# Patient Record
Sex: FEMALE | Race: ASIAN | NOT HISPANIC OR LATINO | URBAN - METROPOLITAN AREA
[De-identification: names, ages, dates, MRNs, and addresses within clinical notes are randomized per-mention and may not be internally consistent; named-entity substitution may affect disease eponyms.]

---

## 2021-03-08 PROBLEM — H40.1132: Noted: 2021-03-08

## 2022-04-06 ENCOUNTER — PREPPED CHART (OUTPATIENT)
Dept: URBAN - METROPOLITAN AREA CLINIC 94 | Facility: CLINIC | Age: 67
End: 2022-04-06

## 2022-08-24 ENCOUNTER — FOLLOW UP (OUTPATIENT)
Dept: URBAN - METROPOLITAN AREA CLINIC 94 | Facility: CLINIC | Age: 67
End: 2022-08-24

## 2022-08-24 DIAGNOSIS — H40.1132: ICD-10-CM

## 2022-08-24 PROCEDURE — 92133 CPTRZD OPH DX IMG PST SGM ON: CPT

## 2022-08-24 PROCEDURE — 92083 EXTENDED VISUAL FIELD XM: CPT

## 2022-08-24 PROCEDURE — 92012 INTRM OPH EXAM EST PATIENT: CPT

## 2022-08-24 ASSESSMENT — TONOMETRY
OD_IOP_MMHG: 14
OS_IOP_MMHG: 15

## 2022-08-24 ASSESSMENT — VISUAL ACUITY
OD_CC: 20/20
OS_CC: 20/20

## 2023-03-08 ENCOUNTER — OFFICE VISIT (OUTPATIENT)
Dept: INTERNAL MEDICINE CLINIC | Facility: CLINIC | Age: 68
End: 2023-03-08

## 2023-03-08 ENCOUNTER — TELEPHONE (OUTPATIENT)
Dept: INTERNAL MEDICINE CLINIC | Facility: CLINIC | Age: 68
End: 2023-03-08

## 2023-03-08 VITALS
TEMPERATURE: 98 F | HEART RATE: 59 BPM | BODY MASS INDEX: 24.32 KG/M2 | DIASTOLIC BLOOD PRESSURE: 74 MMHG | WEIGHT: 146 LBS | OXYGEN SATURATION: 100 % | HEIGHT: 65 IN | SYSTOLIC BLOOD PRESSURE: 160 MMHG

## 2023-03-08 DIAGNOSIS — Z12.31 ENCOUNTER FOR SCREENING MAMMOGRAM FOR MALIGNANT NEOPLASM OF BREAST: ICD-10-CM

## 2023-03-08 DIAGNOSIS — Z23 ENCOUNTER FOR IMMUNIZATION: ICD-10-CM

## 2023-03-08 DIAGNOSIS — Z12.31 ENCOUNTER FOR SCREENING MAMMOGRAM FOR BREAST CANCER: ICD-10-CM

## 2023-03-08 DIAGNOSIS — I34.1 MITRAL VALVE PROLAPSE: ICD-10-CM

## 2023-03-08 DIAGNOSIS — E55.9 VITAMIN D DEFICIENCY: ICD-10-CM

## 2023-03-08 DIAGNOSIS — R73.03 PREDIABETES: ICD-10-CM

## 2023-03-08 DIAGNOSIS — Z00.00 MEDICARE ANNUAL WELLNESS VISIT, INITIAL: Primary | ICD-10-CM

## 2023-03-08 DIAGNOSIS — M81.0 AGE RELATED OSTEOPOROSIS, UNSPECIFIED PATHOLOGICAL FRACTURE PRESENCE: ICD-10-CM

## 2023-03-08 DIAGNOSIS — M85.859 OSTEOPENIA OF NECK OF FEMUR, UNSPECIFIED LATERALITY: ICD-10-CM

## 2023-03-08 DIAGNOSIS — Z11.59 NEED FOR HEPATITIS C SCREENING TEST: ICD-10-CM

## 2023-03-08 DIAGNOSIS — E03.8 OTHER SPECIFIED HYPOTHYROIDISM: ICD-10-CM

## 2023-03-08 DIAGNOSIS — L94.0 REYNOLDS SYNDROME (HCC): ICD-10-CM

## 2023-03-08 DIAGNOSIS — E78.2 MIXED HYPERLIPIDEMIA: ICD-10-CM

## 2023-03-08 DIAGNOSIS — H40.9 GLAUCOMA OF BOTH EYES, UNSPECIFIED GLAUCOMA TYPE: ICD-10-CM

## 2023-03-08 DIAGNOSIS — R21 RASH: ICD-10-CM

## 2023-03-08 DIAGNOSIS — K74.3 REYNOLDS SYNDROME (HCC): ICD-10-CM

## 2023-03-08 DIAGNOSIS — I10 PRIMARY HYPERTENSION: ICD-10-CM

## 2023-03-08 RX ORDER — VITAMIN B COMPLEX
1000 TABLET ORAL 2 TIMES DAILY
COMMUNITY

## 2023-03-08 RX ORDER — ROSUVASTATIN CALCIUM 10 MG/1
10 TABLET, COATED ORAL DAILY
Qty: 90 TABLET | Refills: 1 | Status: SHIPPED | OUTPATIENT
Start: 2023-03-08

## 2023-03-08 RX ORDER — LATANOPROST 50 UG/ML
1 SOLUTION/ DROPS OPHTHALMIC DAILY
COMMUNITY
Start: 2023-01-31

## 2023-03-08 RX ORDER — GLUCOSA SU 2KCL/CHONDROITIN SU 500-400 MG
CAPSULE ORAL
COMMUNITY

## 2023-03-08 RX ORDER — LEVOTHYROXINE SODIUM 88 UG/1
88 TABLET ORAL DAILY
COMMUNITY
End: 2023-03-08

## 2023-03-08 RX ORDER — LEVOTHYROXINE SODIUM 88 UG/1
88 TABLET ORAL DAILY
Qty: 90 TABLET | Refills: 1 | Status: SHIPPED | OUTPATIENT
Start: 2023-03-08

## 2023-03-08 RX ORDER — LOSARTAN POTASSIUM 25 MG/1
25 TABLET ORAL DAILY
COMMUNITY
Start: 2022-11-15 | End: 2023-11-15

## 2023-03-08 NOTE — ASSESSMENT & PLAN NOTE
Vaccinations: 2 covid boosters done including bivalent booster, flu shot up to date  Has had one pneumonia vaccine, unclear if it's 15 or 23  Had one dose of shingles vaccine   She'll check her vaccine record  Advance care planning:  Cognition:no concern  Urinary incontinence:no concern  Mammogram:ordered  Colon cancer screen:had it done in 2017, was told to repeat in 10y  DEXA: ordered  Healthy diet and regular exercise

## 2023-03-08 NOTE — PATIENT INSTRUCTIONS
Please check the time of your previous pneumonia vaccine  Please check the record of your previous shingles vaccine  New shingles vaccine (shingrix) is recommended  Tdap or Td is recommended every 10y  Medicare Preventive Visit Patient Instructions  Thank you for completing your Welcome to Medicare Visit or Medicare Annual Wellness Visit today  Your next wellness visit will be due in one year (3/8/2024)  The screening/preventive services that you may require over the next 5-10 years are detailed below  Some tests may not apply to you based off risk factors and/or age  Screening tests ordered at today's visit but not completed yet may show as past due  Also, please note that scanned in results may not display below  Preventive Screenings:  Service Recommendations Previous Testing/Comments   Colorectal Cancer Screening  * Colonoscopy    * Fecal Occult Blood Test (FOBT)/Fecal Immunochemical Test (FIT)  * Fecal DNA/Cologuard Test  * Flexible Sigmoidoscopy Age: 39-70 years old   Colonoscopy: every 10 years (may be performed more frequently if at higher risk)  OR  FOBT/FIT: every 1 year  OR  Cologuard: every 3 years  OR  Sigmoidoscopy: every 5 years  Screening may be recommended earlier than age 39 if at higher risk for colorectal cancer  Also, an individualized decision between you and your healthcare provider will decide whether screening between the ages of 74-80 would be appropriate  Colonoscopy: Not on file  FOBT/FIT: Not on file  Cologuard: Not on file  Sigmoidoscopy: Not on file          Breast Cancer Screening Age: 36 years old  Frequency: every 1-2 years  Not required if history of left and right mastectomy Mammogram: Not on file        Cervical Cancer Screening Between the ages of 21-29, pap smear recommended once every 3 years  Between the ages of 33-67, can perform pap smear with HPV co-testing every 5 years     Recommendations may differ for women with a history of total hysterectomy, cervical cancer, or abnormal pap smears in past  Pap Smear: Not on file        Hepatitis C Screening Once for adults born between 1945 and 1965  More frequently in patients at high risk for Hepatitis C Hep C Antibody: Not on file        Diabetes Screening 1-2 times per year if you're at risk for diabetes or have pre-diabetes Fasting glucose: No results in last 5 years (No results in last 5 years)  A1C: No results in last 5 years (No results in last 5 years)      Cholesterol Screening Once every 5 years if you don't have a lipid disorder  May order more often based on risk factors  Lipid panel: Not on file          Other Preventive Screenings Covered by Medicare:  Abdominal Aortic Aneurysm (AAA) Screening: covered once if your at risk  You're considered to be at risk if you have a family history of AAA  Lung Cancer Screening: covers low dose CT scan once per year if you meet all of the following conditions: (1) Age 50-69; (2) No signs or symptoms of lung cancer; (3) Current smoker or have quit smoking within the last 15 years; (4) You have a tobacco smoking history of at least 20 pack years (packs per day multiplied by number of years you smoked); (5) You get a written order from a healthcare provider  Glaucoma Screening: covered annually if you're considered high risk: (1) You have diabetes OR (2) Family history of glaucoma OR (3)  aged 48 and older OR (3)  American aged 72 and older  Osteoporosis Screening: covered every 2 years if you meet one of the following conditions: (1) You're estrogen deficient and at risk for osteoporosis based off medical history and other findings; (2) Have a vertebral abnormality; (3) On glucocorticoid therapy for more than 3 months; (4) Have primary hyperparathyroidism; (5) On osteoporosis medications and need to assess response to drug therapy  Last bone density test (DXA Scan): Not on file  HIV Screening: covered annually if you're between the age of 12-76   Also covered annually if you are younger than 13 and older than 72 with risk factors for HIV infection  For pregnant patients, it is covered up to 3 times per pregnancy  Immunizations:  Immunization Recommendations   Influenza Vaccine Annual influenza vaccination during flu season is recommended for all persons aged >= 6 months who do not have contraindications   Pneumococcal Vaccine   * Pneumococcal conjugate vaccine = PCV13 (Prevnar 13), PCV15 (Vaxneuvance), PCV20 (Prevnar 20)  * Pneumococcal polysaccharide vaccine = PPSV23 (Pneumovax) Adults 25-60 years old: 1-3 doses may be recommended based on certain risk factors  Adults 72 years old: 1-2 doses may be recommended based off what pneumonia vaccine you previously received   Hepatitis B Vaccine 3 dose series if at intermediate or high risk (ex: diabetes, end stage renal disease, liver disease)   Tetanus (Td) Vaccine - COST NOT COVERED BY MEDICARE PART B Following completion of primary series, a booster dose should be given every 10 years to maintain immunity against tetanus  Td may also be given as tetanus wound prophylaxis  Tdap Vaccine - COST NOT COVERED BY MEDICARE PART B Recommended at least once for all adults  For pregnant patients, recommended with each pregnancy  Shingles Vaccine (Shingrix) - COST NOT COVERED BY MEDICARE PART B  2 shot series recommended in those aged 48 and above     Health Maintenance Due:      Topic Date Due    Hepatitis C Screening  Never done    Breast Cancer Screening: Mammogram  Never done    Colorectal Cancer Screening  Never done     Immunizations Due:      Topic Date Due    COVID-19 Vaccine (1) Never done    Pneumococcal Vaccine: 65+ Years (1 - PCV) Never done    Influenza Vaccine (1) Never done     Advance Directives   What are advance directives? Advance directives are legal documents that state your wishes and plans for medical care   These plans are made ahead of time in case you lose your ability to make decisions for yourself  Advance directives can apply to any medical decision, such as the treatments you want, and if you want to donate organs  What are the types of advance directives? There are many types of advance directives, and each state has rules about how to use them  You may choose a combination of any of the following:  Living will: This is a written record of the treatment you want  You can also choose which treatments you do not want, which to limit, and which to stop at a certain time  This includes surgery, medicine, IV fluid, and tube feedings  Durable power of  for healthcare [facility]): This is a written record that states who you want to make healthcare choices for you when you are unable to make them for yourself  This person, called a proxy, is usually a family member or a friend  You may choose more than 1 proxy  Do not resuscitate (DNR) order:  A DNR order is used in case your heart stops beating or you stop breathing  It is a request not to have certain forms of treatment, such as CPR  A DNR order may be included in other types of advance directives  Medical directive: This covers the care that you want if you are in a coma, near death, or unable to make decisions for yourself  You can list the treatments you want for each condition  Treatment may include pain medicine, surgery, blood transfusions, dialysis, IV or tube feedings, and a ventilator (breathing machine)  Values history: This document has questions about your views, beliefs, and how you feel and think about life  This information can help others choose the care that you would choose  Why are advance directives important? An advance directive helps you control your care  Although spoken wishes may be used, it is better to have your wishes written down  Spoken wishes can be misunderstood, or not followed  Treatments may be given even if you do not want them   An advance directive may make it easier for your family to make difficult choices about your care  © Copyright [employer] 2018 Information is for End User's use only and may not be sold, redistributed or otherwise used for commercial purposes   All illustrations and images included in CareNotes® are the copyrighted property of A D A M , Inc  or [address] [place], [place] [address]chad lola

## 2023-03-08 NOTE — TELEPHONE ENCOUNTER
The pharmacy wants to confirm the patient should be getting the rosuvastatin sprinkle capsules and not the tablets  The patient has gotten the tablets in the past  The tablets cost 3 dollars and the capsules are over a hundred dollars  Please advise   Thank you     [facility] [phone]

## 2023-03-08 NOTE — ASSESSMENT & PLAN NOTE
Pt has documented white coat htn  BP at home is well controlled  On losartan 25mg (could not tolerate 50mg)

## 2023-03-08 NOTE — ASSESSMENT & PLAN NOTE
Pt reports she had autoimmune workups done with previous PCP  Symptoms mild  Responds well to warming  Continue to monitor

## 2023-03-08 NOTE — PROGRESS NOTES
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[facility])     Pt reports she had autoimmune workups done with previous PCP  Symptoms mild  Responds well to warming  Continue to monitor  Endocrine    Other specified hypothyroidism     On levothyroxine  Recheck TSH         Relevant Medications    levothyroxine (Levoxyl) 88 mcg tablet    Other Relevant Orders    TSH, 3rd generation with Free T4 reflex       Cardiovascular and Mediastinum    Primary hypertension     Pt has documented white coat htn  BP at home is well controlled  On losartan 25mg (could not tolerate 50mg)           Relevant Medications    losartan (COZAAR) 25 mg tablet    Other Relevant Orders    Ambulatory Referral to Cardiology    CBC and differential    Comprehensive metabolic panel    Mitral valve prolapse     Borderline mitral valve prolapse on previous Echo  Pt would like to establish care with new cardiologist here  Referral entered         Relevant Orders    Ambulatory Referral to Cardiology       Musculoskeletal and Integument    Osteopenia of neck of femur     Reviewed previous DEXA  Due for DEXA  Vit D and exercise         Rash    Relevant Orders    Ambulatory Referral to Dermatology       Other    Medicare annual wellness visit, subsequent - Primary     Vaccinations: 2 covid boosters done including bivalent booster, flu shot up to date  Has had one pneumonia vaccine, unclear if it's 15 or 23  Had one dose of shingles vaccine  She'll check her vaccine record  Advance care planning:  Cognition:no concern  Urinary incontinence:no concern  Mammogram:ordered  Colon cancer screen:had it done in 2017, was told to repeat in 10y  DEXA: ordered  Healthy diet and regular exercise           Mixed hyperlipidemia     On rosuvastatin     Monitor  Recheck lipid panel         Relevant Medications    Rosuvastatin Calcium 10 MG CPSP    Other Relevant Orders    Lipid panel    Prediabetes Lifestyle modification  F/u a1c         Relevant Orders    Hemoglobin A1C    Vitamin D deficiency    Relevant Orders    Vitamin D 25 hydroxy    Glaucoma of both eyes    Relevant Medications    latanoprost (XALATAN) 0 005 % ophthalmic solution    Other Relevant Orders    Ambulatory Referral to Ophthalmology   Other Visit Diagnoses     Need for hepatitis C screening test        Encounter for screening mammogram for breast cancer        Encounter for immunization        Age related osteoporosis, unspecified pathological fracture presence        Relevant Orders    DXA bone density spine hip and pelvis    Encounter for screening mammogram for malignant neoplasm of breast        Relevant Orders    Mammo screening bilateral w 3d & cad           Preventive health issues were discussed with patient, and age appropriate screening tests were ordered as noted in patient's After Visit Summary  Personalized health advice and appropriate referrals for health education or preventive services given if needed, as noted in patient's After Visit Summary  History of Present Illness:     Patient presents for a Medicare Wellness Visit    HPI   Establish care  Moved from [place]  Lives with   Retired   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, anesthesiologist and spine surgeon     Doing well in general     Patient Care Team:  Hedda Closs, MD as PCP - General (Internal Medicine)     Review of Systems:     Review of Systems     Problem List:     Patient Active Problem List   Diagnosis   • Primary hypertension   • Other specified hypothyroidism   • Medicare annual wellness visit, subsequent   • Mitral valve prolapse   • Mixed hyperlipidemia   • Prediabetes   • 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[facility])   • Osteopenia of neck of femur   • Rash   • Vitamin D deficiency   • Glaucoma of both eyes      Past Medical and Surgical History:     Past Medical History:   Diagnosis Date   • Glaucoma    • Hypertension    • Hypothyroid    • Mitral valve prolapse    • Osteopenia    • Vitamin D deficiency      History reviewed  No pertinent surgical history  Family History:     Family History   Problem Relation Age of Onset   • Diabetes Mother    • Stomach cancer Father    • Prostate cancer Brother       Social History:     Social History     Socioeconomic History   • Marital status: /Civil Union     Spouse name: None   • Number of children: None   • Years of education: None   • Highest education level: None   Occupational History   • None   Tobacco Use   • Smoking status: Never   • Smokeless tobacco: Never   Vaping Use   • Vaping Use: Never used   Substance and Sexual Activity   • Alcohol use: Yes     Comment: occ   • Drug use: Never   • Sexual activity: Not Currently   Other Topics Concern   • None   Social History Narrative   • None     Social Determinants of Health     Financial Resource Strain: Not on file   Food Insecurity: Not on file   Transportation Needs: Not on file   Physical Activity: Not on file   Stress: Not on file   Social Connections: Not on file   Intimate Partner Violence: Not on file   Housing Stability: Not on file      Medications and Allergies:     Current Outpatient Medications   Medication Sig Dispense Refill   • Calcium-Magnesium-Vitamin D (CITRACAL CALCIUM+D PO) Take 650 mg by mouth in the morning     • cholecalciferol (VITAMIN D3) 25 mcg (1,000 units) tablet Take 1,000 mcg by mouth 2 (two) times a day     • Coenzyme Q10 (Co Q10) 100 MG CAPS Take by mouth     • latanoprost (XALATAN) 0 005 % ophthalmic solution Administer 1 drop to both eyes in the morning     • levothyroxine (Levoxyl) 88 mcg tablet Take 1 tablet (88 mcg total) by mouth daily 90 tablet 1   • losartan (COZAAR) 25 mg tablet Take 25 mg by mouth daily     • Rosuvastatin Calcium 10 MG CPSP Take 10 mg by mouth in the morning 90 capsule 2     No current facility-administered medications for this visit  No Known Allergies   Immunizations:        There is no immunization history on file for this patient  Health Maintenance:         Topic Date Due   • Hepatitis C Screening  Never done   • Breast Cancer Screening: Mammogram  Never done   • Colorectal Cancer Screening  Never done         Topic Date Due   • COVID-19 Vaccine (1) Never done   • Pneumococcal Vaccine: 65+ Years (1 - PCV) Never done   • Influenza Vaccine (1) Never done      Medicare Screening Tests and Risk Assessments:     Jeff Wynn is here for her Initial Wellness visit  Health Risk Assessment:   Patient rates overall health as very good  Patient feels that their physical health rating is same  Patient is satisfied with their life  Eyesight was rated as same  Hearing was rated as same  Patient feels that their emotional and mental health rating is slightly better  Patients states they are sometimes angry  Patient states they are sometimes unusually tired/fatigued  Pain experienced in the last 7 days has been some  Patient's pain rating has been 1/10  Patient states that she has experienced no weight loss or gain in last 6 months  Fall Risk Screening: In the past year, patient has experienced: no history of falling in past year      Urinary Incontinence Screening:   Patient has not leaked urine accidently in the last six months  Home Safety:  Patient does not have trouble with stairs inside or outside of their home  Patient has working smoke alarms and has working carbon monoxide detector  Home safety hazards include: none  Nutrition:   Current diet is Low Saturated Fat, No Added Salt and Limited junk food  Medications:   Patient is currently taking over-the-counter supplements  OTC medications include: see medication list  Patient is able to manage medications  Activities of Daily Living (ADLs)/Instrumental Activities of Daily Living (IADLs):   Walk and transfer into and out of bed and chair?: Yes  Dress and groom yourself?: Yes    Bathe or shower yourself?: Yes    Feed yourself?  Yes  Do your laundry/housekeeping?: Yes  Manage your money, pay your bills and track your expenses?: Yes  Make your own meals?: Yes    Do your own shopping?: Yes    Previous Hospitalizations:   Any hospitalizations or ED visits within the last 12 months?: No      Advance Care Planning:   Living will: No      PREVENTIVE SCREENINGS        Cervical Cancer Screening:    General: Screening Not Indicated      Lung Cancer Screening:     General: Screening Not Indicated    Screening, Brief Intervention, and Referral to Treatment (SBIRT)    Screening    Typical number of drinks in a week: 1    Single Item Drug Screening:  How often have you used an illegal drug (including marijuana) or a prescription medication for non-medical reasons in the past year? never    Single Item Drug Screen Score: 0  Interpretation: Negative screen for possible drug use disorder    No results found       Physical Exam:     /74 (BP Location: Left arm, Patient Position: Sitting, Cuff Size: Adult) Comment: patient has white coat syndrome- per pt  Pulse 59   Temp 98 °F (36 7 °C) (Probe)   Ht 5' 4 5" (1 638 m)   Wt 66 2 kg (146 lb)   SpO2 100%   BMI 24 67 kg/m²     Physical Exam     Salma Ann MD

## 2023-03-08 NOTE — ASSESSMENT & PLAN NOTE
Borderline mitral valve prolapse on previous Echo  Pt would like to establish care with new cardiologist here   Referral entered

## 2023-04-05 ENCOUNTER — COMPLETE EYE EXAM (OUTPATIENT)
Dept: URBAN - METROPOLITAN AREA CLINIC 94 | Facility: CLINIC | Age: 68
End: 2023-04-05

## 2023-04-05 DIAGNOSIS — H25.13: ICD-10-CM

## 2023-04-05 DIAGNOSIS — H52.13: ICD-10-CM

## 2023-04-05 DIAGNOSIS — H40.1132: ICD-10-CM

## 2023-04-05 DIAGNOSIS — H02.826: ICD-10-CM

## 2023-04-05 DIAGNOSIS — H02.823: ICD-10-CM

## 2023-04-05 PROCEDURE — 92020 GONIOSCOPY: CPT

## 2023-04-05 PROCEDURE — 92015 DETERMINE REFRACTIVE STATE: CPT

## 2023-04-05 PROCEDURE — 92014 COMPRE OPH EXAM EST PT 1/>: CPT

## 2023-04-05 ASSESSMENT — VISUAL ACUITY
OU_CC: J1+
OS_CC: 20/20
OD_CC: 20/20

## 2023-04-05 ASSESSMENT — TONOMETRY
OD_IOP_MMHG: 17
OS_IOP_MMHG: 16

## 2023-05-07 PROBLEM — Z00.00 MEDICARE ANNUAL WELLNESS VISIT, SUBSEQUENT: Status: RESOLVED | Noted: 2023-03-08 | Resolved: 2023-05-07

## 2023-05-22 ENCOUNTER — OFFICE VISIT (OUTPATIENT)
Dept: INTERNAL MEDICINE CLINIC | Facility: CLINIC | Age: 68
End: 2023-05-22

## 2023-05-22 VITALS
SYSTOLIC BLOOD PRESSURE: 140 MMHG | HEIGHT: 64 IN | BODY MASS INDEX: 25.33 KG/M2 | OXYGEN SATURATION: 96 % | HEART RATE: 67 BPM | WEIGHT: 148.4 LBS | DIASTOLIC BLOOD PRESSURE: 82 MMHG

## 2023-05-22 DIAGNOSIS — M25.511 ACUTE PAIN OF RIGHT SHOULDER: Primary | ICD-10-CM

## 2023-05-22 RX ORDER — ASPIRIN 81 MG/1
81 TABLET, CHEWABLE ORAL DAILY
COMMUNITY

## 2023-05-22 NOTE — ASSESSMENT & PLAN NOTE
Patient's examination consistent with right biceps tendinitis as well as right rotator cuff tendinitis   -Patient has been given a referral for physical therapy  -She may take ibuprofen as needed for pain and inflammation  -She may also apply heat/ice to the affected area as needed

## 2023-05-25 ENCOUNTER — EVALUATION (OUTPATIENT)
Dept: PHYSICAL THERAPY | Age: 68
End: 2023-05-25

## 2023-05-25 DIAGNOSIS — M75.81 TENDINITIS OF RIGHT ROTATOR CUFF: Primary | ICD-10-CM

## 2023-05-25 DIAGNOSIS — M25.511 ACUTE PAIN OF RIGHT SHOULDER: ICD-10-CM

## 2023-05-25 DIAGNOSIS — M75.91 RIGHT SUPRASPINATUS TENDINITIS: ICD-10-CM

## 2023-05-25 NOTE — PROGRESS NOTES
PT Evaluation     Today's date: 2023  Patient name: Homero Wood  : 1955  MRN: [id number]  Referring provider: Mali Dick  Dx:   Encounter Diagnosis     ICD-10-CM    1  Tendinitis of right rotator cuff  M75 81       2  Acute pain of right shoulder  M25 511 Ambulatory Referral to Physical Therapy      3  Right supraspinatus tendinitis  M75 91                      Assessment  Assessment details: Homero Wood is a 75 y o  female who presents with complaints of right shoulder pain without radicular symptoms  No further referral is necessary at this time  Patient has a movement impairment diagnosis of decreased postural control with capsular tightness of right shoulder representing a pathoanatomical diagnosis of rotator cuff tendonitis  Patient is experiencing decreased strength in R shoulder with decreased postural control and pain with functional activities that limits her ability to perform at PLOF  Patient has a positive prognosis  Patient would benefit from PT to address these impairments leading to increased functional capacity and improved quality of life  Patient made aware of condition as well as the proposed treatment plan, including risks, benefits and alternatives  Impairments: impaired physical strength, lacks appropriate home exercise program, pain with function and poor posture     Symptom irritability: lowUnderstanding of Dx/Px/POC: good   Prognosis: good    Goals  Short Term Goals: to be achieved by 4 weeks  1) Patient to be independent with basic HEP  2) Decrease pain to 4/10 at its worst   3) Increase shoulder strength by 1/2 MMT grade in all deficient planes  Long Term Goals: to be achieved by discharge  1) FOTO equal to or greater than 72  2) Patient to be independent with comprehensive HEP  3) Patient will demonstrate maximal over head reaching without pain  4) Increase UE strength to 5/5 MMT grade in all planes to improve a/iadls    5) Patient to report no sleep interruption secondary to pain  6) Patient to be able participate in pickle ball without complaints of pain  Plan  Plan details: Address physical impairments found during IE via therapeutic exercises, neuromuscular re-education, and manual therapy to improve functional tolerance  Develop HEP for self-management of symptoms  Patient would benefit from: skilled physical therapy  Referral necessary: No  Planned therapy interventions: home exercise program, joint mobilization, manual therapy, massage, neuromuscular re-education, patient education, postural training, strengthening, stretching, therapeutic activities, therapeutic exercise and therapeutic training  Frequency: 2x week  Duration in visits: 8  Duration in weeks: 16  Plan of Care beginning date: 2023  Plan of Care expiration date: 2023  Treatment plan discussed with: patient        Subjective Evaluation    History of Present Illness  Mechanism of injury: Patient reports pain in shoulder, on/off thing  When she applies pressure it's painful  She reports thinking it's due to overuse, about a month ago she was making food and stirring and thinks that aggravated her symptoms more  Says as long she rests it feels better  Also started playing pickle ball but aggravates shoulder and has stopped  Advil has helped with symptoms but didn't want to continue to take  Ice wasn't too helpful but heat seemed to help more  Not limited but has pain with functional activities  Sometimes pain at night when sleeping, waking up but able to fall back asleep             Not a recurrent problem   Quality of life: good    Pain  Current pain ratin  At best pain ratin  At worst pain ratin  Location: R shoulder, anterior  Quality: dull ache and sharp  Relieving factors: heat, change in position and rest  Aggravating factors: lifting  Progression: improved      Diagnostic Tests  No diagnostic tests performed  Treatments  No previous or current treatments  Patient Goals  Patient goals for therapy: decreased pain, increased strength and return to sport/leisure activities  Patient goal: use shoulder without pain, return to pickle ball, strength training        Objective     General Comments:      Shoulder Comments   Posture: rounded shoulders and forward head, slight increase thoracic kyphosis  Palpation: R biceps tendon TTP, R supraspinatus tendon TTP           Reflexes:  (L/R) C5-6: 2+   C5-6: 2+     C7: 2+                Cervical  % of normal  Flex  100  Extn  100  SB Left   100  SB Right 100  ROT Left 100  ROT Right 100            MMT         AROM          PROM   Shoulder       L       R        L           R      L     R  Flex  4+ 4- 180  180! 180 180! Abd   4+ 4- 180  180! 180 180! IR   4+ 4-! T7  T10 WNL WNL     ER   4+ 4-! T3  T3 WNL WNL             Mid Trap 4+ 4-       Low Trap 4+ 4-!                         Rotator Cuff Testing:  ER Lag: negative   Drop Arm: negative   Arc Sign: positive      Belly Press: negative   Lift Off: negative    Impingement Testing:   Corbin: positive  Arc Sign: positive     Segmental mobility: GHJ:  R capsular tightness L WNL        Flowsheet Rows    Flowsheet Row Most Recent Value   PT/OT G-Codes    Current Score 65   Projected Score 72             Precautions: HTN      Manuals 5/25            Pec minor stretching             Thoracic PAs                                       Neuro Re-Ed             Chin tuck HEP            scap squeezes HEP            YTA             Rows             shrugs             LPD             Serratus punch             Serratus wall slides             Ther Ex             Doorway stretch HEP            Pt education CS            Cat Cow             Thoracic ext                                                                 Ther Activity                                       Gait Training                                       Modalities

## 2023-05-30 ENCOUNTER — OFFICE VISIT (OUTPATIENT)
Dept: PHYSICAL THERAPY | Age: 68
End: 2023-05-30

## 2023-05-30 DIAGNOSIS — M25.511 ACUTE PAIN OF RIGHT SHOULDER: Primary | ICD-10-CM

## 2023-05-30 DIAGNOSIS — M75.81 TENDINITIS OF RIGHT ROTATOR CUFF: ICD-10-CM

## 2023-05-30 DIAGNOSIS — M75.91 RIGHT SUPRASPINATUS TENDINITIS: ICD-10-CM

## 2023-05-30 NOTE — PROGRESS NOTES
"Daily Note     Today's date: 2023  Patient name: Abhi Toscano  : 1955  MRN: [id number]  Referring provider: Cheri Smith  Dx:   Encounter Diagnosis     ICD-10-CM    1  Acute pain of right shoulder  M25 511       2  Tendinitis of right rotator cuff  M75 81       3  Right supraspinatus tendinitis  M75 91                      Subjective: Patient currently denies pain, states she had a little bit of pain after doing the doorway stretch at home, maybe because it was too much of a stretch, but applied some ice and then it felt better  Objective: See treatment diary below      Assessment: Tolerated treatment well  Favorable response to CPA of thoracic spine without complaints of pain  Reviewed form with doorway stretch  Progression of periscapular strengthening with patient demonstrating good form  Focused session on thoracic mobility and strengthening for improved postural control  Min discomfort from bolster on thoracic spine while performing thoracic extensions but completed all reps  Patient would benefit from continued PT to address remaining deficits  Plan: Continue per plan of care  Progress treatment as tolerated         Precautions: HTN      Manuals            Pec minor stretching             Thoracic PAs  CS                                     Neuro Re-Ed             Chin tuck HEP 20x5\"           scap squeezes HEP            YTA  2x10x2\" ea           Rows  3x10 rtb           shrugs             LPD  3x10 rtb           Serratus punch  30x2#           Serratus wall slides             Ther Ex             Doorway stretch HEP 10x10\"           Pt education CS            Cat Cow             Thoracic ext  20x5\"           UBE - thoracic mobility  3'/3'                                                  Ther Activity                                       Gait Training                                       Modalities                                            "

## 2023-06-01 ENCOUNTER — OFFICE VISIT (OUTPATIENT)
Dept: PHYSICAL THERAPY | Age: 68
End: 2023-06-01

## 2023-06-01 DIAGNOSIS — M75.81 TENDINITIS OF RIGHT ROTATOR CUFF: ICD-10-CM

## 2023-06-01 DIAGNOSIS — M25.511 ACUTE PAIN OF RIGHT SHOULDER: Primary | ICD-10-CM

## 2023-06-01 DIAGNOSIS — M75.91 RIGHT SUPRASPINATUS TENDINITIS: ICD-10-CM

## 2023-06-01 NOTE — PROGRESS NOTES
"Daily Note     Today's date: 2023  Patient name: Tanvi Luna  : 1955  MRN: [id number]  Referring provider: Aure Grover  Dx:   Encounter Diagnosis     ICD-10-CM    1  Acute pain of right shoulder  M25 511       2  Tendinitis of right rotator cuff  M75 81       3  Right supraspinatus tendinitis  M75 91                      Subjective: Patient reports some pain over night she noticed in right arm when laying on left side, feels better now except for when touching it, rates symptoms as 2/10  Also notes that sometimes when she turns her head to the right she notices some pain in her shoulder  Objective: See treatment diary below      Assessment: Tolerated treatment well  Minimal tenderness noted on left side of cervical spine with mobilizations and STM  Increased resistance with prone periscapular strengthening, good form noted  Progressions tolerated well without adverse reactions  Patient would benefit from continued PT to address remaining limitations and manage symptoms  Plan: Progress treatment as tolerated         Precautions: HTN      Manuals           Pec minor stretching             Thoracic PAs  CS           Cervical mobs, SG   CS          PROM cervical   CS          SOR   CS          Neuro Re-Ed             Chin tuck HEP 20x5\" 20x5\"          scap squeezes HEP            YTA  2x10x2\" ea 2x10x2\" ea 1#          Rows  3x10 rtb           shrugs             LPD  3x10 rtb           Serratus punch  30x2# 30x3#          Serratus wall slides   2x10x2\"          Ther Ex             Doorway stretch HEP 10x10\" 10x10\"          Pt education CS            Cat Cow   15x5\"          Thoracic ext  20x5\" 20x5\"          UBE - thoracic mobility  3'/3' 3'/3'                                                 Ther Activity                                       Gait Training                                       Modalities                                            "

## 2023-06-05 ENCOUNTER — OFFICE VISIT (OUTPATIENT)
Dept: PHYSICAL THERAPY | Age: 68
End: 2023-06-05
Payer: MEDICARE

## 2023-06-05 DIAGNOSIS — M75.81 TENDINITIS OF RIGHT ROTATOR CUFF: ICD-10-CM

## 2023-06-05 DIAGNOSIS — M75.91 RIGHT SUPRASPINATUS TENDINITIS: ICD-10-CM

## 2023-06-05 DIAGNOSIS — M25.511 ACUTE PAIN OF RIGHT SHOULDER: Primary | ICD-10-CM

## 2023-06-05 PROCEDURE — 97140 MANUAL THERAPY 1/> REGIONS: CPT

## 2023-06-05 PROCEDURE — 97112 NEUROMUSCULAR REEDUCATION: CPT

## 2023-06-05 NOTE — PROGRESS NOTES
"Daily Note     Today's date: 2023  Patient name: Ovi Cooper  : 1955  MRN: [id number]  Referring provider: Gregory Jerome  Dx:   Encounter Diagnosis     ICD-10-CM    1  Acute pain of right shoulder  M25 511       2  Tendinitis of right rotator cuff  M75 81       3  Right supraspinatus tendinitis  M75 91                      Subjective: Patient reports some soreness from doing a lot at home  Not sure if therapy has helped so far but states she's definitely not any worse  Objective: See treatment diary below      Assessment: Tolerated treatment well  Performed prone YTA without weight due to pain in R shoulder while performing with weight  Updated HEP for completion while on vacation for next week  Continued to prioritize postural strengthening exercises for improvement in symptoms, min pain reported but able to complete all reps  Greatest difficulty with eccentric control with right shoulder  Patient would benefit from continued PT to address remaining deficits  Plan: Progress treatment as tolerated  Addition of quadruped Y with chin tuck to improve periscapular strengthening  Progress reps of existing exercises as patient is able        Precautions: HTN      Manuals          Pec minor stretching             Thoracic PAs  CS           Cervical mobs, SG   CS CS         PROM cervical   CS CS         SOR   CS CS         Neuro Re-Ed             Chin tuck HEP 20x5\" 20x5\" 20x5\"         scap squeezes HEP            YTA  2x10x2\" ea 2x10x2\" ea 1# 2x10x2\" Ayala Hansa Y w chin tuck    nv         Rows  3x10 rtb  3x10 gtb         shrugs             LPD  3x10 rtb  3x10 gtb         Serratus punch  30x2# 30x3#          Serratus wall slides   2x10x2\" 2x10x2\"         Banded b/l ER    2x15 rtb         Cheerleaders    2x20 rtb         Ther Ex             Doorway stretch HEP 10x10\" 10x10\"          Pt education CS            Cat Cow   15x5\" 15x5\"         Thoracic ext  " "20x5\" 20x5\"          UBE - thoracic mobility  3'/3' 3'/3' 3'/3'                                                Ther Activity                                       Gait Training                                       Modalities                                            "

## 2023-06-08 ENCOUNTER — OFFICE VISIT (OUTPATIENT)
Dept: PHYSICAL THERAPY | Age: 68
End: 2023-06-08
Payer: MEDICARE

## 2023-06-08 DIAGNOSIS — M75.91 RIGHT SUPRASPINATUS TENDINITIS: ICD-10-CM

## 2023-06-08 DIAGNOSIS — M25.511 ACUTE PAIN OF RIGHT SHOULDER: Primary | ICD-10-CM

## 2023-06-08 DIAGNOSIS — M75.81 TENDINITIS OF RIGHT ROTATOR CUFF: ICD-10-CM

## 2023-06-08 PROCEDURE — 97110 THERAPEUTIC EXERCISES: CPT | Performed by: PHYSICAL THERAPIST

## 2023-06-08 PROCEDURE — 97140 MANUAL THERAPY 1/> REGIONS: CPT | Performed by: PHYSICAL THERAPIST

## 2023-06-08 PROCEDURE — 97112 NEUROMUSCULAR REEDUCATION: CPT | Performed by: PHYSICAL THERAPIST

## 2023-06-08 NOTE — PROGRESS NOTES
"Daily Note     Today's date: 2023  Patient name: Erick Laguerre  : 1955  MRN: [id number]  Referring provider: Joy Galeas  Dx:   Encounter Diagnosis     ICD-10-CM    1  Acute pain of right shoulder  M25 511       2  Tendinitis of right rotator cuff  M75 81       3  Right supraspinatus tendinitis  M75 91                      Subjective: Patient stated minimal pain prior to treatment session  Objective: See treatment diary below      Assessment: Patient demonstrated mild pain when stabilizing on right UE with Quadruped Y exercise; no c/o at completion of treatment session  Plan: Progress treatment as tolerated         Precautions: HTN      Manuals         Pec minor stretching             Thoracic PAs  CS           Cervical mobs, SG   CS CS KK        PROM cervical   CS CS KK        SOR   CS CS KK        Neuro Re-Ed             Chin tuck HEP 20x5\" 20x5\" 20x5\" 20x5\"        scap squeezes HEP            YTA  2x10x2\" ea 2x10x2\" ea 1# 2x10x2\" ea 2x10x2\" Alfreida Folds Y w chin tuck    nv 2x10        Rows  3x10 rtb  3x10 gtb 3x10 gtb        shrugs             LPD  3x10 rtb  3x10 gtb 3x10 gtb        Serratus punch  30x2# 30x3#          Serratus wall slides   2x10x2\" 2x10x2\" 2x10x2\"        Banded b/l ER    2x15 rtb 2x15 rtb        Cheerleaders    2x20 rtb 2x20 rtb        Ther Ex             Doorway stretch HEP 10x10\" 10x10\"          Pt education CS            Cat Cow   15x5\" 15x5\" 15x5\"        Thoracic ext  20x5\" 20x5\"          UBE - thoracic mobility  3'/3' 3'/3' 3'/3' 3'/3'                                               Ther Activity                                       Gait Training                                       Modalities                                            "

## 2023-06-22 ENCOUNTER — OFFICE VISIT (OUTPATIENT)
Dept: PHYSICAL THERAPY | Age: 68
End: 2023-06-22
Payer: MEDICARE

## 2023-06-22 DIAGNOSIS — M75.91 RIGHT SUPRASPINATUS TENDINITIS: ICD-10-CM

## 2023-06-22 DIAGNOSIS — M25.511 ACUTE PAIN OF RIGHT SHOULDER: Primary | ICD-10-CM

## 2023-06-22 DIAGNOSIS — M75.81 TENDINITIS OF RIGHT ROTATOR CUFF: ICD-10-CM

## 2023-06-22 PROCEDURE — 97140 MANUAL THERAPY 1/> REGIONS: CPT

## 2023-06-22 PROCEDURE — 97112 NEUROMUSCULAR REEDUCATION: CPT

## 2023-06-22 NOTE — PROGRESS NOTES
"Daily Note     Today's date: 2023  Patient name: Asa Dolna  : 1955  MRN: [id number]  Referring provider: Herman Shoemaker  Dx:   Encounter Diagnosis     ICD-10-CM    1  Acute pain of right shoulder  M25 511       2  Tendinitis of right rotator cuff  M75 81       3  Right supraspinatus tendinitis  M75 91                      Subjective: Patient reports improvement, having pain less often but also less severe  Not as much pain at night as she used to have  Still notices some pain when reaching occasionally  Objective: See treatment diary below    FOTO =  72    Assessment: Tolerated treatment well  FOTO scores suggest patient perceived functional improvement  Continues to have most difficulty with reaching and heavier lifting activities  Continued with progression of shoulder and periscapular strengthening with appropriate levels of fatigue noted throughout  Patient would benefit from continued PT to address remaining deficits and improve functional tolerance  Plan: Progress treatment as tolerated  Reaching and lifting activities per patient tolerance       Precautions: HTN      Manuals        Pec minor stretching             Thoracic PAs  CS           Cervical mobs, SG   CS CS KK        PROM cervical   CS CS KK        SOR   CS CS KK        Rhythmic stab      CS       PNF resisted: D1+D2      CS       Neuro Re-Ed             Chin tuck HEP 20x5\" 20x5\" 20x5\" 20x5\" 20x3\" w lift       scap squeezes HEP            YTA  2x10x2\" ea 2x10x2\" ea 1# 2x10x2\" ea 2x10x2\" ea        Quadruped Y w chin tuck    nv 2x10 3x10       Rows  3x10 rtb  3x10 gtb 3x10 gtb 3x10 stephie 15#       shrugs             LPD  3x10 rtb  3x10 gtb 3x10 gtb 3x10 stephie 12#       Serratus punch  30x2# 30x3#          Serratus wall slides   2x10x2\" 2x10x2\" 2x10x2\" 3x10x2\"       Banded b/l ER    2x15 rtb 2x15 rtb 3x10 gtb       Cheerleaders    2x20 rtb 2x20 rtb 3x20 gtb       Ther Ex         " "    Doorway stretch HEP 10x10\" 10x10\"          Pt education CS     CS       Cat Cow   15x5\" 15x5\" 15x5\"        Thoracic ext  20x5\" 20x5\"          UBE - thoracic mobility  3'/3' 3'/3' 3'/3' 3'/3' 3'/3'                                              Ther Activity                                       Gait Training                                       Modalities                                            "

## 2023-06-23 ENCOUNTER — OFFICE VISIT (OUTPATIENT)
Dept: PHYSICAL THERAPY | Age: 68
End: 2023-06-23
Payer: MEDICARE

## 2023-06-23 DIAGNOSIS — M25.511 ACUTE PAIN OF RIGHT SHOULDER: Primary | ICD-10-CM

## 2023-06-23 DIAGNOSIS — M75.91 RIGHT SUPRASPINATUS TENDINITIS: ICD-10-CM

## 2023-06-23 DIAGNOSIS — M75.81 TENDINITIS OF RIGHT ROTATOR CUFF: ICD-10-CM

## 2023-06-23 PROCEDURE — 97530 THERAPEUTIC ACTIVITIES: CPT

## 2023-06-23 PROCEDURE — 97112 NEUROMUSCULAR REEDUCATION: CPT

## 2023-06-23 PROCEDURE — 97140 MANUAL THERAPY 1/> REGIONS: CPT

## 2023-06-23 NOTE — PROGRESS NOTES
"Daily Note     Today's date: 2023  Patient name: Van Vargas  : 1955  MRN: [id number]  Referring provider: Hue Majano  Dx:   Encounter Diagnosis     ICD-10-CM    1  Acute pain of right shoulder  M25 511       2  Tendinitis of right rotator cuff  M75 81       3  Right supraspinatus tendinitis  M75 91                      Subjective: Patient reports some soreness following yesterdays session but feeling better this morning  Objective: See treatment diary below      Assessment: Tolerated treatment well  Patient demonstrated improvements in shoulder stability during resisted PNF patterns and rhythmic stabilization with no complaints of pain  Continued with progressions of periscapular and shoulder strengthening  Addition of overhead lifting to address remaining functional limitations with patient demonstrating good form and adequate levels of fatigue, slight pain reported but able to complete all reps  Patient would benefit from continued PT  Plan: Progress treatment as tolerated         Precautions: HTN      Manuals       Pec minor stretching             Thoracic PAs  CS           Cervical mobs, SG   CS CS KK        PROM cervical   CS CS KK        SOR   CS CS KK        Rhythmic stab      CS CS      PNF resisted: D1+D2      CS CS      Neuro Re-Ed             Chin tuck HEP 20x5\" 20x5\" 20x5\" 20x5\" 20x3\" w lift 20x3\" w lift      scap squeezes HEP            YTA  2x10x2\" ea 2x10x2\" ea 1# 2x10x2\" ea 2x10x2\" ea        Quadruped Y w chin tuck    nv 2x10 3x10 3x10       Rows  3x10 rtb  3x10 gtb 3x10 gtb 3x10 stephie 15# 2x15 stephie 15#      shrugs             LPD  3x10 rtb  3x10 gtb 3x10 gtb 3x10 stephie 12# 2x15 stephie 12#      Serratus punch  30x2# 30x3#          Serratus wall slides   2x10x2\" 2x10x2\" 2x10x2\" 3x10x2\" 3x10x2\"      Banded b/l ER    2x15 rtb 2x15 rtb 3x10 gtb 2x15 gtb      Cheerleaders    2x20 rtb 2x20 rtb 3x20 gtb 3x20 gtb      Ther Ex   " "          Doorway stretch HEP 10x10\" 10x10\"          Pt education CS     CS       Cat Cow   15x5\" 15x5\" 15x5\"        Thoracic ext  20x5\" 20x5\"          UBE - thoracic mobility  3'/3' 3'/3' 3'/3' 3'/3' 3'/3' 3'/3'                                             Ther Activity             DB OH lift to shelf       3x10 3#                   Gait Training                                       Modalities                                            "

## 2023-06-26 ENCOUNTER — OFFICE VISIT (OUTPATIENT)
Dept: PHYSICAL THERAPY | Age: 68
End: 2023-06-26
Payer: MEDICARE

## 2023-06-26 DIAGNOSIS — M75.81 TENDINITIS OF RIGHT ROTATOR CUFF: ICD-10-CM

## 2023-06-26 DIAGNOSIS — M75.91 RIGHT SUPRASPINATUS TENDINITIS: ICD-10-CM

## 2023-06-26 DIAGNOSIS — M25.511 ACUTE PAIN OF RIGHT SHOULDER: Primary | ICD-10-CM

## 2023-06-26 PROCEDURE — 97140 MANUAL THERAPY 1/> REGIONS: CPT

## 2023-06-26 PROCEDURE — 97112 NEUROMUSCULAR REEDUCATION: CPT

## 2023-06-26 PROCEDURE — 97110 THERAPEUTIC EXERCISES: CPT

## 2023-06-26 NOTE — PROGRESS NOTES
"Daily Note     Today's date: 2023  Patient name: Patricia Baxter  : 1955  MRN: [id number]  Referring provider: Adeola Simeon  Dx:   Encounter Diagnosis     ICD-10-CM    1  Acute pain of right shoulder  M25 511       2  Tendinitis of right rotator cuff  M75 81       3  Right supraspinatus tendinitis  M75 91                      Subjective: Patient reports some soreness in right shoulder after doing \"regular cleaning\" this weekend  Objective: See treatment diary below      Assessment: Tolerated treatment well  Patient expressed mild discomfort in right shoulder with progression of lifting exercises but able to complete all reps  Increased reps with banded periscapular strengthening, noted compensation with forward translation of cervical spine with mild discomfort  Addition of UT stretch to address stiffness in cervical spine with relief noted  Patient would benefit from continued PT to address remaining deficits  Plan: Progress treatment as tolerated  Banded PNF patterns nv        Precautions: HTN      Manuals      Pec minor stretching             Thoracic PAs  CS           Cervical mobs, SG   CS CS KK        PROM cervical   CS CS KK        SOR   CS CS KK        Rhythmic stab      CS CS CS     PNF resisted: D1+D2      CS CS CS     Neuro Re-Ed             Chin tuck HEP 20x5\" 20x5\" 20x5\" 20x5\" 20x3\" w lift 20x3\" w lift      scap squeezes HEP            YTA  2x10x2\" ea 2x10x2\" ea 1# 2x10x2\" ea 2x10x2\" Alray [place] Y w chin tuck    nv 2x10 3x10 3x10       Rows  3x10 rtb  3x10 gtb 3x10 gtb 3x10 stephie 15# 2x15 stephie 15# 2x15 stephie 15#     shrugs             LPD  3x10 rtb  3x10 gtb 3x10 gtb 3x10 stephie 12# 2x15 stephie 12# 2x15 stephie 12#     Serratus punch  30x2# 30x3#          Serratus wall slides   2x10x2\" 2x10x2\" 2x10x2\" 3x10x2\" 3x10x2\" 2x10x2\" w lift     Banded b/l ER    2x15 rtb 2x15 rtb 3x10 gtb 2x15 gtb 3x15 gtb     Cheerleaders    " "2x20 rtb 2x20 rtb 3x20 gtb 3x20 gtb 3x20 gtb     Standing YT        2x10 1# w chin tuck     Banded PNF        nv     Ther Ex             Doorway stretch HEP 10x10\" 10x10\"          Pt education CS     CS       Cat Cow   15x5\" 15x5\" 15x5\"        Thoracic ext  20x5\" 20x5\"     20x5\"     UBE - thoracic mobility  3'/3' 3'/3' 3'/3' 3'/3' 3'/3' 3'/3' 3'/3'     UT stretch        10x5\"                               Ther Activity             DB OH lift to shelf       3x10 3# 3x10 4#                  Gait Training                                       Modalities                                            "

## 2023-06-29 ENCOUNTER — APPOINTMENT (OUTPATIENT)
Dept: PHYSICAL THERAPY | Age: 68
End: 2023-06-29
Payer: MEDICARE

## 2023-06-30 ENCOUNTER — OFFICE VISIT (OUTPATIENT)
Dept: PHYSICAL THERAPY | Age: 68
End: 2023-06-30
Payer: MEDICARE

## 2023-06-30 DIAGNOSIS — M75.81 TENDINITIS OF RIGHT ROTATOR CUFF: ICD-10-CM

## 2023-06-30 DIAGNOSIS — M25.511 ACUTE PAIN OF RIGHT SHOULDER: Primary | ICD-10-CM

## 2023-06-30 DIAGNOSIS — M75.91 RIGHT SUPRASPINATUS TENDINITIS: ICD-10-CM

## 2023-06-30 PROCEDURE — 97140 MANUAL THERAPY 1/> REGIONS: CPT

## 2023-06-30 PROCEDURE — 97112 NEUROMUSCULAR REEDUCATION: CPT

## 2023-06-30 PROCEDURE — 97110 THERAPEUTIC EXERCISES: CPT

## 2023-06-30 NOTE — PROGRESS NOTES
"Daily Note     Today's date: 2023  Patient name: Tish Hernandez  : 1955  MRN: [id number]  Referring provider: Perla Majano  Dx:   Encounter Diagnosis     ICD-10-CM    1  Acute pain of right shoulder  M25 511       2  Tendinitis of right rotator cuff  M75 81       3  Right supraspinatus tendinitis  M75 91                      Subjective: Patient notes some pain in anterior right shoulder she rates as 3/10, admits to not doing exercises past week because of company  Objective: See treatment diary below      Assessment: Tolerated treatment well  Patient expressed slight pain with theraband resisted PNF patterns into D1 flex but able to complete all reps  Demonstrated appropriate levels of fatigue throughout session with progression of resistance and increased reps during therapeutic exercises  Patient would benefit from continued PT to improve functional tolerance in terms of lifting  Plan: Progress treatment as tolerated  Consider/discuss decreasing frequency to once/week in preparation for discharge       Precautions: HTN      Manuals     Pec minor stretching             Thoracic PAs  CS           Cervical mobs, SG   CS CS KK        PROM cervical   CS CS KK        SOR   CS CS KK        Rhythmic stab      CS CS CS CS    PNF resisted: D1+D2      CS CS CS CS    Neuro Re-Ed             Chin tuck HEP 20x5\" 20x5\" 20x5\" 20x5\" 20x3\" w lift 20x3\" w lift      scap squeezes HEP            YTA  2x10x2\" ea 2x10x2\" ea 1# 2x10x2\" ea 2x10x2\" ea        Quadruped Y w chin tuck    nv 2x10 3x10 3x10       Rows  3x10 rtb  3x10 gtb 3x10 gtb 3x10 stephie 15# 2x15 stephie 15# 2x15 stephie 15# 3x10 stephie 18#    shrugs             LPD  3x10 rtb  3x10 gtb 3x10 gtb 3x10 stephie 12# 2x15 stephie 12# 2x15 stephie 12# 3x10 stephie 15#    Serratus punch  30x2# 30x3#          Serratus wall slides   2x10x2\" 2x10x2\" 2x10x2\" 3x10x2\" 3x10x2\" 2x10x2\" w lift 3x10x2\" w lift  " "  Banded b/l ER    2x15 rtb 2x15 rtb 3x10 gtb 2x15 gtb 3x15 gtb 2x10 btb    Cheerleaders    2x20 rtb 2x20 rtb 3x20 gtb 3x20 gtb 3x20 gtb 2x10 btb    Standing YT        2x10 1# w chin tuck 2x10 2# w chin tuck at wall    Banded PNF        nv 2x10 gtb D1+D2    Ther Ex             Doorway stretch HEP 10x10\" 10x10\"          Pt education CS     CS       Cat Cow   15x5\" 15x5\" 15x5\"        Thoracic ext  20x5\" 20x5\"     20x5\" 15x5\"    UBE - thoracic mobility  3'/3' 3'/3' 3'/3' 3'/3' 3'/3' 3'/3' 3'/3' 3'/3'    UT stretch        10x5\"                               Ther Activity             DB OH lift to shelf       3x10 3# 3x10 4# 3x10 5#                 Gait Training                                       Modalities                                            "

## 2023-07-05 ENCOUNTER — APPOINTMENT (OUTPATIENT)
Dept: PHYSICAL THERAPY | Age: 68
End: 2023-07-05
Payer: MEDICARE

## 2023-07-07 ENCOUNTER — OFFICE VISIT (OUTPATIENT)
Dept: PHYSICAL THERAPY | Age: 68
End: 2023-07-07
Payer: MEDICARE

## 2023-07-07 DIAGNOSIS — M25.511 ACUTE PAIN OF RIGHT SHOULDER: Primary | ICD-10-CM

## 2023-07-07 DIAGNOSIS — M75.91 RIGHT SUPRASPINATUS TENDINITIS: ICD-10-CM

## 2023-07-07 DIAGNOSIS — M75.81 TENDINITIS OF RIGHT ROTATOR CUFF: ICD-10-CM

## 2023-07-07 PROCEDURE — 97140 MANUAL THERAPY 1/> REGIONS: CPT

## 2023-07-07 PROCEDURE — 97112 NEUROMUSCULAR REEDUCATION: CPT

## 2023-07-07 PROCEDURE — 97110 THERAPEUTIC EXERCISES: CPT

## 2023-07-07 NOTE — PROGRESS NOTES
PT Re-Evaluation     Today's date: 2023  Patient name: Lilly Lubin  : 1955  MRN: [id number]  Referring provider: Osker Eastern  Dx:   Encounter Diagnosis     ICD-10-CM    1. Acute pain of right shoulder  M25.511       2. Tendinitis of right rotator cuff  M75.81       3. Right supraspinatus tendinitis  M75.91                      Subjective: Patient reports 60% improvement since beginning therapy. Has noticed improvements in ability to reach with right hand with decreased pain. Also noted improvement with lifting overhead while in kitchen and cooking/stirring. Continues to be limited and have difficulty performing activities such as reaching behind back with right arm, though pain is reduced. Sometimes noticed minimal pain while walking for longer periods of time. Current pain =0. Best pain=0. Worst pain =3.       Objective: See treatment diary below         MMT         AROM          PROM   Shoulder       L       R        L           R      L     R  Flex. 4+ 4- 4 180  180! 180 180! Abd.  4+ 4- 4 180  180! 180 180! IR.  4+ 4-! 4 T7T5  T10T7 WNL WNL     ER.  4+ 4-! 4 T3  T3 WNL WNL             Mid Trap 4+ 4- 4       Low Trap 4+ 4-! 4! FOTO = 72    Assessment: Patient demonstrates functional improvements evidenced by progress towards short and long-term goals. Patient demonstrates improvements in shoulder strength and pain ratings which has improved her functional tolerance. Improved FOTO scores also suggest patient perceives functional improvement since beginning therapy. Patient continues to have limitations with functional internal rotation of right arm with decreased postural strength and slight pain with lifting. Patient would benefit from continued therapy to address remaining deficits and improve overall functional tolerance. Goals  Short Term Goals: to be achieved by 4 weeks  1) Patient to be independent with basic HEP.  Met  2) Decrease pain to 4/10 at its worst. Met  3) Increase shoulder strength by 1/2 MMT grade in all deficient planes. Met    Long Term Goals: to be achieved by discharge  1) FOTO equal to or greater than 72. Met, 72  2) Patient to be independent with comprehensive HEP. Partially met  3) Patient will demonstrate maximal over head reaching without pain. Partially met  4) Increase UE strength to 5/5 MMT grade in all planes to improve a/iadls. 5) Patient to report no sleep interruption secondary to pain. Met  6) Patient to be able participate in [employer] ball without complaints of pain. Not assessed      Plan: Decrease frequency to once/week for 4 additional visits to transition to HEP and assess readiness for discharge. Update HEP as necessary for return to PLOF.      Frequency: 1x week  Duration in visits: 4  Duration in weeks: 4  Plan of Care beginning date: 7/7/2023  Plan of Care expiration date: 8/4/2023  Treatment plan discussed with: patient      Precautions: HTN      Manuals 5/25 5/30 6/1 6/5 6/8 6/22 6/23 6/26 6/30 7/7   Pec minor stretching             Thoracic PAs  CS           Cervical mobs, SG   CS CS KK        PROM cervical   CS CS KK        SOR   CS CS KK        Rhythmic stab      CS CS CS CS    PNF resisted: D1+D2      CS CS CS CS    Re-assess          CS   Neuro Re-Ed             Chin tuck HEP 20x5" 20x5" 20x5" 20x5" 20x3" w lift 20x3" w lift      scap squeezes HEP            YTA  2x10x2" ea 2x10x2" ea 1# 2x10x2" ea 2x10x2" ea        Quadruped Y w chin tuck    nv 2x10 3x10 3x10       Rows  3x10 rtb  3x10 gtb 3x10 gtb 3x10 stephie 15# 2x15 stephie 15# 2x15 stephie 15# 3x10 stephie 18#    shrugs             LPD  3x10 rtb  3x10 gtb 3x10 gtb 3x10 stephie 12# 2x15 stephie 12# 2x15 stephie 12# 3x10 stephie 15#    Serratus punch  30x2# 30x3#          Serratus wall slides   2x10x2" 2x10x2" 2x10x2" 3x10x2" 3x10x2" 2x10x2" w lift 3x10x2" w lift 3x10x2" w lift   Banded b/l ER    2x15 rtb 2x15 rtb 3x10 gtb 2x15 gtb 3x15 gtb 2x10 btb 3x10 btb   Cheerleaders 2x20 rtb 2x20 rtb 3x20 gtb 3x20 gtb 3x20 gtb 2x10 btb 3x10 btb   Standing YT        2x10 1# w chin tuck 2x10 2# w chin tuck at wall 2x10 2#    Banded PNF        nv 2x10 gtb D1+D2    Ther Ex             Doorway stretch HEP 10x10" 10x10"          Pt education CS     CS       Cat Cow   15x5" 15x5" 15x5"        Thoracic ext  20x5" 20x5"     20x5" 15x5" 15x5"   UBE - thoracic mobility  3'/3' 3'/3' 3'/3' 3'/3' 3'/3' 3'/3' 3'/3' 3'/3' 3'/3'   UT stretch        10x5"     IR stretch with strap          10x10"                Ther Activity             DB OH lift to shelf       3x10 3# 3x10 4# 3x10 5#                 Gait Training                                       Modalities

## 2023-07-10 ENCOUNTER — OFFICE VISIT (OUTPATIENT)
Dept: PHYSICAL THERAPY | Age: 68
End: 2023-07-10
Payer: MEDICARE

## 2023-07-10 DIAGNOSIS — M75.81 TENDINITIS OF RIGHT ROTATOR CUFF: ICD-10-CM

## 2023-07-10 DIAGNOSIS — M25.511 ACUTE PAIN OF RIGHT SHOULDER: Primary | ICD-10-CM

## 2023-07-10 DIAGNOSIS — M75.91 RIGHT SUPRASPINATUS TENDINITIS: ICD-10-CM

## 2023-07-10 PROCEDURE — 97110 THERAPEUTIC EXERCISES: CPT

## 2023-07-10 PROCEDURE — 97112 NEUROMUSCULAR REEDUCATION: CPT

## 2023-07-10 PROCEDURE — 97530 THERAPEUTIC ACTIVITIES: CPT

## 2023-07-10 NOTE — PROGRESS NOTES
Daily Note     Today's date: 7/10/2023  Patient name: Grant Montero  : 1955  MRN: [id number]  Referring provider: Ruben Zamora  Dx:   Encounter Diagnosis     ICD-10-CM    1. Acute pain of right shoulder  M25.511       2. Tendinitis of right rotator cuff  M75.81       3. Right supraspinatus tendinitis  M75.91                      Subjective: Patient reports some pain while playing ping pong this past weekend, discouraged by still having pain but notes pain isn't nearly as bad as it was before. Also notes some discomfort with lifting and reaching but symptoms are resolved as of this morning. Questioning whether or not she needs to consult with ortho for further testing. Objective: See treatment diary below      Assessment: Tolerated treatment well. 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 Followed with thread the needle to promote thoracic mobility for improved shoulder movement without pain and improve posture. Progression of reaching/lifting in an attempt to replicate movements that cause pain, min pain reported with reaching out to the side with weight medicine ball but able to complete all reps. Patient demonstrated significant fatigue with resisted D1 extension. Patient is appropriate for continued therapy to improve UE strength and improve functional tolerance. Plan: Progress treatment as tolerated.        Precautions: HTN      Manuals  7/10   Pec minor stretching          Thoracic PAs       CS   Cervical mobs, SG KK         PROM cervical KK         SOR KK         Rhythmic stab  CS CS CS CS     PNF resisted: D1+D2  CS CS CS CS     Re-assess      CS    Neuro Re-Ed          Chin tuck 20x5" 20x3" w lift 20x3" w lift       scap squeezes          YTA 2x10x2" ea         Quadruped Y w chin tuck 2x10 3x10 3x10        Rows 3x10 gtb 3x10 stephie 15# 2x15 stephie 15# 2x15 stephie 15# 3x10 stephie 18#     shrugs          LPD 3x10 gtb 3x10 stephie 12# 2x15 stephie 12# 2x15 stephie 12# 3x10 stephie 15#     Serratus punch          Serratus wall slides 2x10x2" 3x10x2" 3x10x2" 2x10x2" w lift 3x10x2" w lift 3x10x2" w lift    Banded b/l ER 2x15 rtb 3x10 gtb 2x15 gtb 3x15 gtb 2x10 btb 3x10 btb 3x10 btb   Cheerleaders 2x20 rtb 3x20 gtb 3x20 gtb 3x20 gtb 2x10 btb 3x10 btb 3x10 btb   Standing YT    2x10 1# w chin tuck 2x10 2# w chin tuck at wall 2x10 2#  2x10 3#   Banded PNF    nv 2x10 gtb D1+D2  x16 D1 gtb, D2   Ther Ex          Doorway stretch          Pt education  CS        Cat Cow 15x5"         Thoracic ext    20x5" 15x5" 15x5"    UBE - thoracic mobility 3'/3' 3'/3' 3'/3' 3'/3' 3'/3' 3'/3' 3'/3'   UT stretch    10x5"      IR stretch with strap      10x10" 10x10"   Thread the needle       x10 b/l   Ther Activity          DB OH lift to shelf   3x10 3# 3x10 4# 3x10 5#  3x10 5#   Med ball OH lift to shelf       YMB 3x10   Forward and lateral reach       GMB x20 ea   Gait Training                              Modalities

## 2023-07-14 ENCOUNTER — APPOINTMENT (OUTPATIENT)
Dept: PHYSICAL THERAPY | Age: 68
End: 2023-07-14
Payer: MEDICARE

## 2023-07-18 ENCOUNTER — OFFICE VISIT (OUTPATIENT)
Dept: PHYSICAL THERAPY | Age: 68
End: 2023-07-18
Payer: MEDICARE

## 2023-07-18 DIAGNOSIS — M75.81 TENDINITIS OF RIGHT ROTATOR CUFF: ICD-10-CM

## 2023-07-18 DIAGNOSIS — M75.91 RIGHT SUPRASPINATUS TENDINITIS: ICD-10-CM

## 2023-07-18 DIAGNOSIS — M25.511 ACUTE PAIN OF RIGHT SHOULDER: Primary | ICD-10-CM

## 2023-07-18 PROCEDURE — 97140 MANUAL THERAPY 1/> REGIONS: CPT

## 2023-07-18 PROCEDURE — 97112 NEUROMUSCULAR REEDUCATION: CPT

## 2023-07-18 PROCEDURE — 97110 THERAPEUTIC EXERCISES: CPT

## 2023-07-18 NOTE — PROGRESS NOTES
Daily Note     Today's date: 2023  Patient name: Branden Weber  : 1955  MRN: [id number]  Referring provider: Davon Salas  Dx:   Encounter Diagnosis     ICD-10-CM    1. Acute pain of right shoulder  M25.511       2. Tendinitis of right rotator cuff  M75.81       3. Right supraspinatus tendinitis  M75.91                      Subjective: Patient reports some discomfort while stirring and making food however, noted improvements with reaching behind back to put on bra without any pain. Objective: See treatment diary below      Assessment: Improvement in shoulder mobility with decreased pain following GH joint mobs in AP and inferior direction. Further improvement in shoulder flexion and abduction following grade IV CPA of thoracic spine with cavitation noted. Followed with thoracic mobility and stabilization exercises to maintain progress. Addition of closed chain ball circles for improved shoulder stability with appropriate fatigue noted. Patient would benefit from continued therapy to address remaining strength deficits to improve overall function. Plan: Progress treatment as tolerated.        Precautions: HTN      Manuals 6/8 6/22 6/23 6/26 6/30 7/7 7/10 7/18   Thoracic PAs       CS CS Gr IV   GH joint mobs        CS   L PROM shoulder        CS   Rhythmic stab  CS CS CS CS      PNF resisted: D1+D2  CS CS CS CS      Re-assess      CS     Neuro Re-Ed           Chin tuck 20x5" 20x3" w lift 20x3" w lift        scap squeezes           YTA 2x10x2" ea          Quadruped Y w chin tuck 2x10 3x10 3x10         Rows 3x10 gtb 3x10 stephie 15# 2x15 stephie 15# 2x15 stephie 15# 3x10 stephie 18#      shrugs           LPD 3x10 gtb 3x10 stephie 12# 2x15 stephie 12# 2x15 stephie 12# 3x10 stephie 15#      Serratus punch           Serratus wall slides 2x10x2" 3x10x2" 3x10x2" 2x10x2" w lift 3x10x2" w lift 3x10x2" w lift  2x10x2"   Banded b/l ER 2x15 rtb 3x10 gtb 2x15 gtb 3x15 gtb 2x10 btb 3x10 btb 3x10 btb    Cheerleaders 2x20 rtb 3x20 gtb 3x20 gtb 3x20 gtb 2x10 btb 3x10 btb 3x10 btb    Standing YT    2x10 1# w chin tuck 2x10 2# w chin tuck at wall 2x10 2#  2x10 3# 3x10 3# ea   Banded PNF    nv 2x10 gtb D1+D2  x16 D1 gtb, D2    Ball circles, serratus activation        x30 CW/CCW RMB   Serratus pushup +        2x5   Ther Ex           Doorway stretch           Pt education  CS         Cat Cow 15x5"          Thoracic ext    20x5" 15x5" 15x5"     UBE - thoracic mobility 3'/3' 3'/3' 3'/3' 3'/3' 3'/3' 3'/3' 3'/3' 3'/3'   UT stretch    10x5"       IR stretch with strap      10x10" 10x10"    Thread the needle       x10 b/l    Ther Activity           DB OH lift to shelf   3x10 3# 3x10 4# 3x10 5#  3x10 5#    Med ball OH lift to shelf       YMB 3x10    Forward and lateral reach       GMB x20 ea    Gait Training                                 Modalities

## 2023-07-25 ENCOUNTER — OFFICE VISIT (OUTPATIENT)
Dept: PHYSICAL THERAPY | Age: 68
End: 2023-07-25
Payer: MEDICARE

## 2023-07-25 DIAGNOSIS — M75.91 RIGHT SUPRASPINATUS TENDINITIS: ICD-10-CM

## 2023-07-25 DIAGNOSIS — M75.81 TENDINITIS OF RIGHT ROTATOR CUFF: ICD-10-CM

## 2023-07-25 DIAGNOSIS — M25.511 ACUTE PAIN OF RIGHT SHOULDER: Primary | ICD-10-CM

## 2023-07-25 PROCEDURE — 97110 THERAPEUTIC EXERCISES: CPT

## 2023-07-25 PROCEDURE — 97112 NEUROMUSCULAR REEDUCATION: CPT

## 2023-07-25 NOTE — PROGRESS NOTES
Daily Note     Today's date: 2023  Patient name: Jane Huffman  : 1955  MRN: [id number]  Referring provider: Joey Landon  Dx:   Encounter Diagnosis     ICD-10-CM    1. Acute pain of right shoulder  M25.511       2. Tendinitis of right rotator cuff  M75.81       3. Right supraspinatus tendinitis  M75.91                      Subjective: Patient reports mild pain, 3/10, when reaching out in front of her. Objective: See treatment diary below      Assessment: Tolerated treatment well. Patient demonstrated weakness in right serratus anterior, shoulder internal rotators, and posterior postural muscles on right side. Addressed with progression of exercises with patient demonstrating good form with verbal cues and no increase in symptoms following. No symptoms reported throughout session. Patient would benefit from continued PT to address remaining deficits and return to PLOF. Plan: Progress treatment as tolerated.        Precautions: HTN      Manuals 6/8 6/22 6/23 6/26 6/30 7/7 7/10 7/18 7/25   Thoracic PAs       CS CS Gr IV    GH joint mobs        CS    L PROM shoulder        CS    Rhythmic stab  CS CS CS CS       PNF resisted: D1+D2  CS CS CS CS       Re-assess      CS      Neuro Re-Ed            Chin tuck 20x5" 20x3" w lift 20x3" w lift         scap squeezes            YTA 2x10x2" [employer] Im Y w chin tuck 2x10 3x10 3x10          Rows 3x10 gtb 3x10 stephie 15# 2x15 stephie 15# 2x15 stephie 15# 3x10 stephie 18#       shrugs            LPD 3x10 gtb 3x10 stephie 12# 2x15 stephie 12# 2x15 stephie 12# 3x10 stephie 15#       Serratus punch            Serratus wall slides 2x10x2" 3x10x2" 3x10x2" 2x10x2" w lift 3x10x2" w lift 3x10x2" w lift  2x10x2" 3x5   Banded b/l ER 2x15 rtb 3x10 gtb 2x15 gtb 3x15 gtb 2x10 btb 3x10 btb 3x10 btb     Cheerleaders 2x20 rtb 3x20 gtb 3x20 gtb 3x20 gtb 2x10 btb 3x10 btb 3x10 btb     Standing YT    2x10 1# w chin tuck 2x10 2# w chin tuck at wall 2x10 2#  2x10 3# 3x10 3# ea    Banded PNF    nv 2x10 gtb D1+D2  x16 D1 gtb, D2     Ball circles, serratus activation        x30 CW/CCW RMB    Serratus pushup +        2x5 2x10   Prone TY         2x10 R ea   Tree hugs         gtb 2x10   Kneeling IR/ER at 90         2x10 single rtb   Ther Ex            Doorway stretch            Pt education  CS       CS   Cat Cow 15x5"           Thoracic ext    20x5" 15x5" 15x5"      UBE - thoracic mobility 3'/3' 3'/3' 3'/3' 3'/3' 3'/3' 3'/3' 3'/3' 3'/3' 3'/3'   UT stretch    10x5"        IR stretch with strap      10x10" 10x10"     Thread the needle       x10 b/l     Ther Activity            DB OH lift to shelf   3x10 3# 3x10 4# 3x10 5#  3x10 5#     Med ball OH lift to shelf       YMB 3x10     Forward and lateral reach       GMB x20 ea     Gait Training                                    Modalities

## 2023-07-31 ENCOUNTER — APPOINTMENT (OUTPATIENT)
Dept: LAB | Age: 68
End: 2023-07-31
Payer: MEDICARE

## 2023-07-31 DIAGNOSIS — E78.2 MIXED HYPERLIPIDEMIA: ICD-10-CM

## 2023-07-31 DIAGNOSIS — Z00.00 MEDICARE ANNUAL WELLNESS VISIT, INITIAL: ICD-10-CM

## 2023-07-31 DIAGNOSIS — E03.8 OTHER SPECIFIED HYPOTHYROIDISM: ICD-10-CM

## 2023-07-31 DIAGNOSIS — R73.03 PREDIABETES: ICD-10-CM

## 2023-07-31 DIAGNOSIS — E55.9 VITAMIN D DEFICIENCY: ICD-10-CM

## 2023-07-31 DIAGNOSIS — I10 PRIMARY HYPERTENSION: ICD-10-CM

## 2023-07-31 LAB
25(OH)D3 SERPL-MCNC: 34.1 NG/ML (ref 30–100)
ALBUMIN SERPL BCP-MCNC: 3.8 G/DL (ref 3.5–5)
ALP SERPL-CCNC: 70 U/L (ref 46–116)
ALT SERPL W P-5'-P-CCNC: 18 U/L (ref 12–78)
ANION GAP SERPL CALCULATED.3IONS-SCNC: 3 MMOL/L
AST SERPL W P-5'-P-CCNC: 11 U/L (ref 5–45)
BASOPHILS # BLD AUTO: 0.08 THOUSANDS/ÂΜL (ref 0–0.1)
BASOPHILS NFR BLD AUTO: 1 % (ref 0–1)
BILIRUB SERPL-MCNC: 0.46 MG/DL (ref 0.2–1)
BUN SERPL-MCNC: 9 MG/DL (ref 5–25)
CALCIUM SERPL-MCNC: 9.2 MG/DL (ref 8.3–10.1)
CHLORIDE SERPL-SCNC: 105 MMOL/L (ref 96–108)
CHOLEST SERPL-MCNC: 159 MG/DL
CO2 SERPL-SCNC: 29 MMOL/L (ref 21–32)
CREAT SERPL-MCNC: 0.94 MG/DL (ref 0.6–1.3)
EOSINOPHIL # BLD AUTO: 0.17 THOUSAND/ÂΜL (ref 0–0.61)
EOSINOPHIL NFR BLD AUTO: 3 % (ref 0–6)
ERYTHROCYTE [DISTWIDTH] IN BLOOD BY AUTOMATED COUNT: 12.2 % (ref 11.6–15.1)
EST. AVERAGE GLUCOSE BLD GHB EST-MCNC: 143 MG/DL
GFR SERPL CREATININE-BSD FRML MDRD: 62 ML/MIN/1.73SQ M
GLUCOSE P FAST SERPL-MCNC: 134 MG/DL (ref 65–99)
HBA1C MFR BLD: 6.6 %
HCT VFR BLD AUTO: 36.6 % (ref 34.8–46.1)
HDLC SERPL-MCNC: 58 MG/DL
HGB BLD-MCNC: 12.3 G/DL (ref 11.5–15.4)
IMM GRANULOCYTES # BLD AUTO: 0.02 THOUSAND/UL (ref 0–0.2)
IMM GRANULOCYTES NFR BLD AUTO: 0 % (ref 0–2)
LDLC SERPL CALC-MCNC: 72 MG/DL (ref 0–100)
LYMPHOCYTES # BLD AUTO: 2.76 THOUSANDS/ÂΜL (ref 0.6–4.47)
LYMPHOCYTES NFR BLD AUTO: 48 % (ref 14–44)
MCH RBC QN AUTO: 32.1 PG (ref 26.8–34.3)
MCHC RBC AUTO-ENTMCNC: 33.6 G/DL (ref 31.4–37.4)
MCV RBC AUTO: 96 FL (ref 82–98)
MONOCYTES # BLD AUTO: 0.42 THOUSAND/ÂΜL (ref 0.17–1.22)
MONOCYTES NFR BLD AUTO: 7 % (ref 4–12)
NEUTROPHILS # BLD AUTO: 2.39 THOUSANDS/ÂΜL (ref 1.85–7.62)
NEUTS SEG NFR BLD AUTO: 41 % (ref 43–75)
NONHDLC SERPL-MCNC: 101 MG/DL
NRBC BLD AUTO-RTO: 0 /100 WBCS
PLATELET # BLD AUTO: 212 THOUSANDS/UL (ref 149–390)
PMV BLD AUTO: 11.5 FL (ref 8.9–12.7)
POTASSIUM SERPL-SCNC: 4.7 MMOL/L (ref 3.5–5.3)
PROT SERPL-MCNC: 7.6 G/DL (ref 6.4–8.4)
RBC # BLD AUTO: 3.83 MILLION/UL (ref 3.81–5.12)
SODIUM SERPL-SCNC: 137 MMOL/L (ref 135–147)
TRIGL SERPL-MCNC: 147 MG/DL
TSH SERPL DL<=0.05 MIU/L-ACNC: 2.52 UIU/ML (ref 0.45–4.5)
WBC # BLD AUTO: 5.84 THOUSAND/UL (ref 4.31–10.16)

## 2023-07-31 PROCEDURE — 80061 LIPID PANEL: CPT

## 2023-07-31 PROCEDURE — 80053 COMPREHEN METABOLIC PANEL: CPT

## 2023-07-31 PROCEDURE — 84443 ASSAY THYROID STIM HORMONE: CPT

## 2023-07-31 PROCEDURE — 83036 HEMOGLOBIN GLYCOSYLATED A1C: CPT

## 2023-07-31 PROCEDURE — 85025 COMPLETE CBC W/AUTO DIFF WBC: CPT

## 2023-07-31 PROCEDURE — 36415 COLL VENOUS BLD VENIPUNCTURE: CPT

## 2023-07-31 PROCEDURE — 82306 VITAMIN D 25 HYDROXY: CPT

## 2023-08-01 ENCOUNTER — OFFICE VISIT (OUTPATIENT)
Dept: PHYSICAL THERAPY | Age: 68
End: 2023-08-01
Payer: MEDICARE

## 2023-08-01 DIAGNOSIS — M25.511 ACUTE PAIN OF RIGHT SHOULDER: Primary | ICD-10-CM

## 2023-08-01 DIAGNOSIS — M75.91 RIGHT SUPRASPINATUS TENDINITIS: ICD-10-CM

## 2023-08-01 DIAGNOSIS — M75.81 TENDINITIS OF RIGHT ROTATOR CUFF: ICD-10-CM

## 2023-08-01 PROCEDURE — 97110 THERAPEUTIC EXERCISES: CPT

## 2023-08-01 PROCEDURE — 97140 MANUAL THERAPY 1/> REGIONS: CPT

## 2023-08-01 PROCEDURE — 97112 NEUROMUSCULAR REEDUCATION: CPT

## 2023-08-01 NOTE — PROGRESS NOTES
Daily Note     Today's date: 2023  Patient name: Tacho Chen  : 1955  MRN: [id number]  Referring provider: Karlene Leal  Dx:   Encounter Diagnosis     ICD-10-CM    1. Acute pain of right shoulder  M25.511       2. Tendinitis of right rotator cuff  M75.81       3. Right supraspinatus tendinitis  M75.91                      Subjective: Patient reports 75% improvement since beginning therapy. Has noticed improvements in decreased frequency of clicking. Has also tried to do some cleaning around the house and didn't have any pain. Continues to be have some discomfort occasionally when reaching or lifting. Current pain =0. Best pain=0. Worst pain =2.     Progress 2023: Patient reports 60% improvement since beginning therapy. Has noticed improvements in ability to reach with right hand with decreased pain. Also noted improvement with lifting overhead while in kitchen and cooking/stirring. Continues to be limited and have difficulty performing activities such as reaching behind back with right arm, though pain is reduced. Sometimes noticed minimal pain while walking for longer periods of time. Current pain =0. Best pain=0. Worst pain =3.       Objective: See treatment diary below           MMT         AROM          PROM   Shoulder       L       R        L           R      L     R  Flex. 4+ 4- 4 180  180! 180 180! Abd.  4+ 4- 4 180  180! 180 180! IR.  4+ 4-! 4 T7T5  T10T7 WNL WNL     ER.  4+ 4-! 4 T3  T3 WNL WNL             Mid Trap 4+ 4- 4       Low Trap 4+ 4-! 4! FOTO = 72    Assessment: Patient has completed course of formal physical therapy making improvements in functional tolerance evidenced by progress towards short and long-term goals. Patient has also demonstrated improvements in terms of FOTO scores. Patient has improved strength and mobility of right UE and is appropriate and agreeable to discharge at this time.  Patient was provided with updated HEP and will call with any questions/concerns regarding condition moving forward. Goals  Short Term Goals: to be achieved by 4 weeks  1) Patient to be independent with basic HEP. Met  2) Decrease pain to 4/10 at its worst. Met  3) Increase shoulder strength by 1/2 MMT grade in all deficient planes. Met    Long Term Goals: to be achieved by discharge  1) FOTO equal to or greater than 72. Met, 72  2) Patient to be independent with comprehensive HEP. Met  3) Patient will demonstrate maximal over head reaching without pain. Met  4) Increase UE strength to 5/5 MMT grade in all planes to improve a/iadls. 5) Patient to report no sleep interruption secondary to pain. Met  6) Patient to be able participate in [place] ball without complaints of pain. Hasn't attempted      Plan: Discharge with updated HEP.      Precautions: HTN      Manuals 6/8 6/22 6/23 6/26 6/30 7/7 7/10 7/18 7/25 8/1   Thoracic PAs       CS CS Gr IV     GH joint mobs        CS     L PROM shoulder        CS     Rhythmic stab  CS CS CS CS        PNF resisted: D1+D2  CS CS CS CS        Re-assess      CS    CS   Neuro Re-Ed             Chin tuck 20x5" 20x3" w lift 20x3" w lift          scap squeezes             YTA 2x10x2" [employer] Y w chin tuck 2x10 3x10 3x10           Rows 3x10 gtb 3x10 stephie 15# 2x15 stephie 15# 2x15 stephie 15# 3x10 stephie 18#     2x10 gtb   shrugs             LPD 3x10 gtb 3x10 stephie 12# 2x15 stephie 12# 2x15 stephie 12# 3x10 stephie 15#     2x10 gtb   Serratus punch             Serratus wall slides 2x10x2" 3x10x2" 3x10x2" 2x10x2" w lift 3x10x2" w lift 3x10x2" w lift  2x10x2" 3x5 2x10x2"   Banded b/l ER 2x15 rtb 3x10 gtb 2x15 gtb 3x15 gtb 2x10 btb 3x10 btb 3x10 btb   3x10 gtb   Cheerleaders 2x20 rtb 3x20 gtb 3x20 gtb 3x20 gtb 2x10 btb 3x10 btb 3x10 btb      Standing YT    2x10 1# w chin tuck 2x10 2# w chin tuck at wall 2x10 2#  2x10 3# 3x10 3# ea     Banded PNF    nv 2x10 gtb D1+D2  x16 D1 gtb, D2      Ball circles, serratus activation x30 CW/CCW RMB     Serratus pushup +        2x5 2x10    Prone TY         2x10 R ea    Tree hugs         gtb 2x10    Kneeling IR/ER at 90         2x10 single rtb 2x10 single gtb   PNF pattern          2x10 single rtb ea R   Ther Ex             Doorway stretch             Pt education  CS       CS CS   Cat Cow 15x5"            Thoracic ext    20x5" 15x5" 15x5"       UBE - thoracic mobility 3'/3' 3'/3' 3'/3' 3'/3' 3'/3' 3'/3' 3'/3' 3'/3' 3'/3' 3'/3'   UT stretch    10x5"         IR stretch with strap      10x10" 10x10"      Thread the needle       x10 b/l      Ther Activity             DB OH lift to shelf   3x10 3# 3x10 4# 3x10 5#  3x10 5#      Med ball OH lift to shelf       YMB 3x10      Forward and lateral reach       GMB x20 ea      Gait Training                                       Modalities

## 2023-08-15 ENCOUNTER — NEW PATIENT COMPREHENSIVE (OUTPATIENT)
Dept: URBAN - METROPOLITAN AREA CLINIC 6 | Facility: CLINIC | Age: 68
End: 2023-08-15

## 2023-08-15 DIAGNOSIS — H43.813: ICD-10-CM

## 2023-08-15 DIAGNOSIS — H25.13: ICD-10-CM

## 2023-08-15 DIAGNOSIS — H40.1132: ICD-10-CM

## 2023-08-15 PROCEDURE — 92020 GONIOSCOPY: CPT

## 2023-08-15 PROCEDURE — 92133 CPTRZD OPH DX IMG PST SGM ON: CPT

## 2023-08-15 PROCEDURE — 92202 OPSCPY EXTND ON/MAC DRAW: CPT

## 2023-08-15 PROCEDURE — 99204 OFFICE O/P NEW MOD 45 MIN: CPT

## 2023-08-15 ASSESSMENT — TONOMETRY
OD_IOP_MMHG: 19
OD_IOP_MMHG: 18
OS_IOP_MMHG: 13
OS_IOP_MMHG: 18

## 2023-08-15 ASSESSMENT — VISUAL ACUITY
OS_CC: 20/20
OD_CC: 20/20

## 2023-08-25 DIAGNOSIS — E03.8 OTHER SPECIFIED HYPOTHYROIDISM: ICD-10-CM

## 2023-08-25 RX ORDER — LEVOTHYROXINE SODIUM 88 UG/1
88 TABLET ORAL DAILY
Qty: 90 TABLET | Refills: 1 | Status: SHIPPED | OUTPATIENT
Start: 2023-08-25

## 2023-08-28 DIAGNOSIS — E78.2 MIXED HYPERLIPIDEMIA: ICD-10-CM

## 2023-08-28 RX ORDER — ROSUVASTATIN CALCIUM 10 MG/1
10 TABLET, COATED ORAL DAILY
Qty: 90 TABLET | Refills: 1 | Status: SHIPPED | OUTPATIENT
Start: 2023-08-28

## 2023-09-13 ENCOUNTER — OFFICE VISIT (OUTPATIENT)
Dept: INTERNAL MEDICINE CLINIC | Facility: CLINIC | Age: 68
End: 2023-09-13
Payer: MEDICARE

## 2023-09-13 VITALS
WEIGHT: 146 LBS | RESPIRATION RATE: 16 BRPM | OXYGEN SATURATION: 98 % | SYSTOLIC BLOOD PRESSURE: 130 MMHG | HEIGHT: 64 IN | HEART RATE: 58 BPM | DIASTOLIC BLOOD PRESSURE: 74 MMHG | BODY MASS INDEX: 24.92 KG/M2

## 2023-09-13 DIAGNOSIS — E11.9 TYPE 2 DIABETES MELLITUS WITHOUT COMPLICATION, WITH LONG-TERM CURRENT USE OF INSULIN (HCC): Primary | ICD-10-CM

## 2023-09-13 DIAGNOSIS — E55.9 VITAMIN D DEFICIENCY: ICD-10-CM

## 2023-09-13 DIAGNOSIS — Z23 ENCOUNTER FOR IMMUNIZATION: ICD-10-CM

## 2023-09-13 DIAGNOSIS — I10 PRIMARY HYPERTENSION: ICD-10-CM

## 2023-09-13 DIAGNOSIS — Z12.11 COLON CANCER SCREENING: ICD-10-CM

## 2023-09-13 DIAGNOSIS — L94.0 REYNOLDS SYNDROME: ICD-10-CM

## 2023-09-13 DIAGNOSIS — I34.1 MITRAL VALVE PROLAPSE: ICD-10-CM

## 2023-09-13 DIAGNOSIS — Z12.31 ENCOUNTER FOR SCREENING MAMMOGRAM FOR BREAST CANCER: ICD-10-CM

## 2023-09-13 DIAGNOSIS — E78.2 MIXED HYPERLIPIDEMIA: ICD-10-CM

## 2023-09-13 DIAGNOSIS — M85.859 OSTEOPENIA OF NECK OF FEMUR, UNSPECIFIED LATERALITY: ICD-10-CM

## 2023-09-13 DIAGNOSIS — Z78.0 ASYMPTOMATIC MENOPAUSAL STATE: ICD-10-CM

## 2023-09-13 DIAGNOSIS — Z11.59 NEED FOR HEPATITIS C SCREENING TEST: ICD-10-CM

## 2023-09-13 DIAGNOSIS — Z79.4 TYPE 2 DIABETES MELLITUS WITHOUT COMPLICATION, WITH LONG-TERM CURRENT USE OF INSULIN (HCC): Primary | ICD-10-CM

## 2023-09-13 DIAGNOSIS — K74.3 REYNOLDS SYNDROME: ICD-10-CM

## 2023-09-13 PROCEDURE — 99214 OFFICE O/P EST MOD 30 MIN: CPT | Performed by: INTERNAL MEDICINE

## 2023-09-13 RX ORDER — ZOSTER VACCINE RECOMBINANT, ADJUVANTED 50 MCG/0.5
0.5 KIT INTRAMUSCULAR ONCE
Qty: 1 EACH | Refills: 1 | Status: SHIPPED | OUTPATIENT
Start: 2023-09-13 | End: 2023-09-13

## 2023-09-13 NOTE — PATIENT INSTRUCTIONS
Type 2 Diabetes Management for Adults   AMBULATORY CARE:   Type 2 diabetes  is a disease that affects how your body uses glucose (sugar). Either your body cannot make enough insulin, or it cannot use the insulin correctly. It is important to keep diabetes controlled to prevent damage to your heart, blood vessels, and other organs. Management will help you feel well and enjoy your daily activities. Your diabetes care team providers can help you make a plan to fit diabetes care into your schedule. Your plan can change over time to fit your needs and your family's needs. Have someone call your local emergency number (911 in the [address]) if:   • You cannot be woken. • You have signs of diabetic ketoacidosis:     ? confusion, fatigue    ? vomiting    ? rapid heartbeat    ? fruity smelling breath    ? extreme thirst    ? dry mouth and skin    • You have any of the following signs of a heart attack:      ? Squeezing, pressure, or pain in your chest    ? You may  also have any of the following:     - Discomfort or pain in your back, neck, jaw, stomach, or arm    - Shortness of breath    - Nausea or vomiting    - Lightheadedness or a sudden cold sweat    • You have any of the following signs of a stroke:      ? Numbness or drooping on one side of your face     ? Weakness in an arm or leg    ? Confusion or difficulty speaking    ? Dizziness, a severe headache, or vision loss    Call your doctor or diabetes care team provider if:   • You have a sore or wound that will not heal.    • You have a change in the amount you urinate. • Your blood sugar levels are higher than your target goals. • You often have lower blood sugar levels than your target goals. • Your skin is red, dry, warm, or swollen. • You have trouble coping with diabetes, or you feel anxious or depressed. • You have questions or concerns about your condition or care.     What you need to know about high blood sugar levels:  High blood sugar levels may not cause any symptoms. You may feel more thirsty or urinate more often than usual. Over time, high blood sugar levels can damage your nerves, blood vessels, tissues, and organs. The following can increase your blood sugar levels:  • Large meals or large amounts of carbohydrates at one time    • Less physical activity    • Stress    • Illness    • A lower dose of diabetes medicine or insulin, or a late dose    What you need to know about low blood sugar levels:  Symptoms include feeling shaky, dizzy, irritable, or confused. You can prevent symptoms by keeping your blood sugar levels from going too low. • Treat a low blood sugar level right away:      ? Drink 4 ounces of juice or have 1 tube of glucose gel. ? Check your blood sugar level again 10 to 15 minutes later. ? When the level goes back to normal, eat a meal or snack to prevent another decrease. • Keep glucose gel, raisins, or hard candy with you at all times to treat a low blood sugar level. • Your blood sugar level can get too low if you take diabetes medicine or insulin and do not eat enough food. • If you use insulin, check your blood sugar level before you exercise. ? If your blood sugar level is below 100 mg/dL, eat 4 crackers or 2 ounces of raisins, or drink 4 ounces of juice. ? Check your level every 30 minutes if you exercise longer than 1 hour. ? You may need a snack during or after exercise. What you can do to manage your blood sugar levels:   • Check your blood sugar levels as directed and as needed. Several items are available to use to check your levels. You may need to check by testing a drop of blood in a glucose monitor. You may instead be given a continuous glucose monitoring (CGM) device. The device is worn at all times. The CGM checks your blood sugar level every 5 minutes. It sends results to an electronic device such as a smart phone. A CGM can be used with or without an insulin pump.  You and your diabetes care team providers will decide on the best method for you. The goal for blood sugar levels before meals  is between 80 and 130 mg/dL and 2 hours after eating  is lower than 180 mg/dL. • Make healthy food choices. Work with a dietitian to develop a meal plan that works for you and your schedule. A dietitian can help you learn how to eat the right amount of carbohydrates during your meals and snacks. Carbohydrates can raise your blood sugar level if you eat too many at one time. Examples of foods that contain carbohydrates are breads, cereals, rice, pasta, and sweets. • Eat high-fiber foods as directed. Fiber helps improve blood sugar levels. Fiber also lowers your risk for heart disease and other problems diabetes can cause. Examples of high-fiber foods include vegetables, whole-grain bread, and beans such as velez beans. Your dietitian can tell you how much fiber to have each day. • Get regular physical activity. Physical activity can help you get to your target blood sugar level goal and manage your weight. Get at least 150 minutes of moderate to vigorous aerobic physical activity each week. Do not miss more than 2 days in a row. Do not sit longer than 30 minutes at a time. Your healthcare provider can help you create an activity plan. The plan can include the best activities for you and can help you build your strength and endurance. • Maintain a healthy weight. Ask your team what a healthy weight is for you. A healthy weight can help you control diabetes and prevent heart disease. Ask your team to help you create a weight loss plan, if needed. Weight loss can help make a difference in managing diabetes. Your team will help you set a weight-loss goal, such as 10 to 15 pounds, or 5% of your extra weight. Together you and your team can set manageable weight loss goals. • Take your diabetes medicine or insulin as directed.   You may need diabetes medicine, insulin, or both to help control your blood sugar levels. Your healthcare provider will teach you how and when to take your diabetes medicine or insulin. You will also be taught about side effects oral diabetes medicine can cause. Insulin may be injected or given through a pump or pen. You and your providers will decide on the best method for you:    ? An insulin pump  is an implanted device that gives your insulin 24 hours a day. An insulin pump prevents the need for multiple insulin injections in a day. ? An insulin pen  is a device prefilled with the right amount of insulin. ? You and your family members will be taught how to draw up and give insulin  if this is the best method for you. Your providers will also teach you how to dispose of needles and syringes. ? You will learn how much insulin you need  and when to give it. You will be taught when not to give insulin. You will also be taught what to do if your blood sugar level drops too low. This may happen if you take insulin and do not eat the right amount of carbohydrates. More ways to manage type 2 diabetes:   • Wear medical alert identification. Wear medical alert jewelry or carry a card that says you have diabetes. Ask your provider where to get these items. • Do not smoke. Nicotine and other chemicals in cigarettes and cigars can cause lung and blood vessel damage. It also makes it more difficult to manage your diabetes. Ask your provider for information if you currently smoke and need help to quit. Do not use e-cigarettes or smokeless tobacco in place of cigarettes or to help you quit. They still contain nicotine. • Check your feet each day for cuts, scratches, calluses, or other wounds. Look for redness and swelling, and feel for warmth. Wear shoes that fit well. Check your shoes for rocks or other objects that can hurt your feet. Do not walk barefoot or wear shoes without socks.  Wear cotton socks to help keep your feet dry. • Ask about vaccines you may need. You have a higher risk for serious illness if you get the flu, pneumonia, COVID-19, or hepatitis. Ask your provider if you should get vaccines to prevent these or other diseases, and when to get the vaccines. • Talk to your provider if you become stressed about diabetes care. Sometimes being able to fit diabetes care into your life can cause increased stress. The stress can cause you not to take care of yourself properly. Your care team providers can help by offering tips about self-care. Your providers may suggest you talk to a mental health provider who can listen and offer help with self-care issues. • Have your A1c checked as directed. Your provider may check your A1c every 3 months, or 2 times each year if your diabetes is controlled. An A1c test shows the average amount of sugar in your blood over the past 2 to 3 months. Your provider will tell you what your A1c level should be. • Have screening tests as directed. Your provider may recommend screening for complications of diabetes and other conditions that may develop. Some screenings may begin right away and some may happen within the first 5 years of diagnosis:    ? Examples of diabetes complications  include kidney problems, high cholesterol, high blood pressure, blood vessel problems, eye problems, and sleep apnea. ? You may be screened for a low vitamin B level  if you take oral diabetes medicine for a long time. ? Women of childbearing years may be screened  for polycystic ovarian syndrome (PCOS). Follow up with your doctor or diabetes care team providers as directed: You may need to have blood tests done before your follow-up visit. The test results will show if changes need to be made in your treatment or self-care. Talk to your provider if you cannot afford your medicine. Write down your questions so you remember to ask them during your visits.   © Copyright [employer] 2022 Information is for End User's use only and may not be sold, redistributed or otherwise used for commercial purposes. The above information is an  only. It is not intended as medical advice for individual conditions or treatments. Talk to your doctor, nurse or pharmacist before following any medical regimen to see if it is safe and effective for you. Type 2 Diabetes Management for Adults   AMBULATORY CARE:   Type 2 diabetes  is a disease that affects how your body uses glucose (sugar). Either your body cannot make enough insulin, or it cannot use the insulin correctly. It is important to keep diabetes controlled to prevent damage to your heart, blood vessels, and other organs. Management will help you feel well and enjoy your daily activities. Your diabetes care team providers can help you make a plan to fit diabetes care into your schedule. Your plan can change over time to fit your needs and your family's needs. Have someone call your local emergency number (911 in the [address]) if:   • You cannot be woken. • You have signs of diabetic ketoacidosis:     ? confusion, fatigue    ? vomiting    ? rapid heartbeat    ? fruity smelling breath    ? extreme thirst    ? dry mouth and skin    • You have any of the following signs of a heart attack:      ? Squeezing, pressure, or pain in your chest    ? You may  also have any of the following:     - Discomfort or pain in your back, neck, jaw, stomach, or arm    - Shortness of breath    - Nausea or vomiting    - Lightheadedness or a sudden cold sweat    • You have any of the following signs of a stroke:      ? Numbness or drooping on one side of your face     ? Weakness in an arm or leg    ? Confusion or difficulty speaking    ? Dizziness, a severe headache, or vision loss    Call your doctor or diabetes care team provider if:   • You have a sore or wound that will not heal.    • You have a change in the amount you urinate.     • Your blood sugar levels are higher than your target goals. • You often have lower blood sugar levels than your target goals. • Your skin is red, dry, warm, or swollen. • You have trouble coping with diabetes, or you feel anxious or depressed. • You have questions or concerns about your condition or care. What you need to know about high blood sugar levels:  High blood sugar levels may not cause any symptoms. You may feel more thirsty or urinate more often than usual. Over time, high blood sugar levels can damage your nerves, blood vessels, tissues, and organs. The following can increase your blood sugar levels:  • Large meals or large amounts of carbohydrates at one time    • Less physical activity    • Stress    • Illness    • A lower dose of diabetes medicine or insulin, or a late dose    What you need to know about low blood sugar levels:  Symptoms include feeling shaky, dizzy, irritable, or confused. You can prevent symptoms by keeping your blood sugar levels from going too low. • Treat a low blood sugar level right away:      ? Drink 4 ounces of juice or have 1 tube of glucose gel. ? Check your blood sugar level again 10 to 15 minutes later. ? When the level goes back to normal, eat a meal or snack to prevent another decrease. • Keep glucose gel, raisins, or hard candy with you at all times to treat a low blood sugar level. • Your blood sugar level can get too low if you take diabetes medicine or insulin and do not eat enough food. • If you use insulin, check your blood sugar level before you exercise. ? If your blood sugar level is below 100 mg/dL, eat 4 crackers or 2 ounces of raisins, or drink 4 ounces of juice. ? Check your level every 30 minutes if you exercise longer than 1 hour. ? You may need a snack during or after exercise. What you can do to manage your blood sugar levels:   • Check your blood sugar levels as directed and as needed.   Several items are available to use to check your levels. You may need to check by testing a drop of blood in a glucose monitor. You may instead be given a continuous glucose monitoring (CGM) device. The device is worn at all times. The CGM checks your blood sugar level every 5 minutes. It sends results to an electronic device such as a smart phone. A CGM can be used with or without an insulin pump. You and your diabetes care team providers will decide on the best method for you. The goal for blood sugar levels before meals  is between 80 and 130 mg/dL and 2 hours after eating  is lower than 180 mg/dL. • Make healthy food choices. Work with a dietitian to develop a meal plan that works for you and your schedule. A dietitian can help you learn how to eat the right amount of carbohydrates during your meals and snacks. Carbohydrates can raise your blood sugar level if you eat too many at one time. Examples of foods that contain carbohydrates are breads, cereals, rice, pasta, and sweets. • Eat high-fiber foods as directed. Fiber helps improve blood sugar levels. Fiber also lowers your risk for heart disease and other problems diabetes can cause. Examples of high-fiber foods include vegetables, whole-grain bread, and beans such as velez beans. Your dietitian can tell you how much fiber to have each day. • Get regular physical activity. Physical activity can help you get to your target blood sugar level goal and manage your weight. Get at least 150 minutes of moderate to vigorous aerobic physical activity each week. Do not miss more than 2 days in a row. Do not sit longer than 30 minutes at a time. Your healthcare provider can help you create an activity plan. The plan can include the best activities for you and can help you build your strength and endurance. • Maintain a healthy weight. Ask your team what a healthy weight is for you. A healthy weight can help you control diabetes and prevent heart disease.  Ask your team to help you create a weight loss plan, if needed. Weight loss can help make a difference in managing diabetes. Your team will help you set a weight-loss goal, such as 10 to 15 pounds, or 5% of your extra weight. Together you and your team can set manageable weight loss goals. • Take your diabetes medicine or insulin as directed. You may need diabetes medicine, insulin, or both to help control your blood sugar levels. Your healthcare provider will teach you how and when to take your diabetes medicine or insulin. You will also be taught about side effects oral diabetes medicine can cause. Insulin may be injected or given through a pump or pen. You and your providers will decide on the best method for you:    ? An insulin pump  is an implanted device that gives your insulin 24 hours a day. An insulin pump prevents the need for multiple insulin injections in a day. ? An insulin pen  is a device prefilled with the right amount of insulin. ? You and your family members will be taught how to draw up and give insulin  if this is the best method for you. Your providers will also teach you how to dispose of needles and syringes. ? You will learn how much insulin you need  and when to give it. You will be taught when not to give insulin. You will also be taught what to do if your blood sugar level drops too low. This may happen if you take insulin and do not eat the right amount of carbohydrates. More ways to manage type 2 diabetes:   • Wear medical alert identification. Wear medical alert jewelry or carry a card that says you have diabetes. Ask your provider where to get these items. • Do not smoke. Nicotine and other chemicals in cigarettes and cigars can cause lung and blood vessel damage. It also makes it more difficult to manage your diabetes. Ask your provider for information if you currently smoke and need help to quit.  Do not use e-cigarettes or smokeless tobacco in place of cigarettes or to help you quit. They still contain nicotine. • Check your feet each day for cuts, scratches, calluses, or other wounds. Look for redness and swelling, and feel for warmth. Wear shoes that fit well. Check your shoes for rocks or other objects that can hurt your feet. Do not walk barefoot or wear shoes without socks. Wear cotton socks to help keep your feet dry. • Ask about vaccines you may need. You have a higher risk for serious illness if you get the flu, pneumonia, COVID-19, or hepatitis. Ask your provider if you should get vaccines to prevent these or other diseases, and when to get the vaccines. • Talk to your provider if you become stressed about diabetes care. Sometimes being able to fit diabetes care into your life can cause increased stress. The stress can cause you not to take care of yourself properly. Your care team providers can help by offering tips about self-care. Your providers may suggest you talk to a mental health provider who can listen and offer help with self-care issues. • Have your A1c checked as directed. Your provider may check your A1c every 3 months, or 2 times each year if your diabetes is controlled. An A1c test shows the average amount of sugar in your blood over the past 2 to 3 months. Your provider will tell you what your A1c level should be. • Have screening tests as directed. Your provider may recommend screening for complications of diabetes and other conditions that may develop. Some screenings may begin right away and some may happen within the first 5 years of diagnosis:    ? Examples of diabetes complications  include kidney problems, high cholesterol, high blood pressure, blood vessel problems, eye problems, and sleep apnea. ? You may be screened for a low vitamin B level  if you take oral diabetes medicine for a long time. ? Women of childbearing years may be screened  for polycystic ovarian syndrome (PCOS).     Follow up with your doctor or diabetes care team providers as directed: You may need to have blood tests done before your follow-up visit. The test results will show if changes need to be made in your treatment or self-care. Talk to your provider if you cannot afford your medicine. Write down your questions so you remember to ask them during your visits. © Copyright [employer] 2022 Information is for End User's use only and may not be sold, redistributed or otherwise used for commercial purposes. The above information is an  only. It is not intended as medical advice for individual conditions or treatments. Talk to your doctor, nurse or pharmacist before following any medical regimen to see if it is safe and effective for you.

## 2023-09-13 NOTE — PROGRESS NOTES
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[address])  Symptoms are mild in nature the patient does have a tendency to low blood pressure no infarction of the skin we will treat her conservatively wearing warm gloves/hand warmers during the winter months if there is progressive symptoms or infarction of the skin we can consider a BB dose of amlodipine    Primary hypertension  Patient reports me that more recently she is getting dizziness with bending down and having low blood pressure readings/low normal blood pressure readings she reports me that she has a history of whitecoat hypertension and that on the day of her stress test in the past the blood pressure was elevated losartan was added at that point in time and has been reduced but still continues to have ongoing low blood pressure and lightheadedness/dizziness with bending over. At this point time we will discontinue the losartan. I would like her to monitor her blood pressure twice daily if the blood pressure is greater than 140/90 to notify me and then we can reinitiate half the dose of losartan. Osteopenia of neck of femur  Currently on vitamin D/Citracal weightbearing exercises check a DEXA scan    Mixed hyperlipidemia  Hyperlipidemia controlled continue with current medical regiment recommend a low-cholesterol diet and recommend routine exercise we will continue to monitor the progress.   Continue Crestor 10 mg once daily    Mitral valve prolapse  Reviewed previous records mild and currently asymptomatic    Type 2 diabetes mellitus without complication, with long-term current use of insulin ([facility])    Lab Results   Component Value Date    HGBA1C 6.6 (H) 07/31/2023   New onset type 2 diabetes the explained to the patient she does meet the criteria for type 2 diabetes at this point in time I would like her to follow a healthy and balanced diet reducing carbohydrates and sweets we will monitor diabetic labs in 3 to 4 months currently she will be on diet control         Problem List Items Addressed This Visit        Digestive    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[facility])     Symptoms are mild in nature the patient does have a tendency to low blood pressure no infarction of the skin we will treat her conservatively wearing warm gloves/hand warmers during the winter months if there is progressive symptoms or infarction of the skin we can consider a BB dose of amlodipine            Endocrine    Type 2 diabetes mellitus without complication, with long-term current use of insulin ([address]) - Primary       Lab Results   Component Value Date    HGBA1C 6.6 (H) 07/31/2023   New onset type 2 diabetes the explained to the patient she does meet the criteria for type 2 diabetes at this point in time I would like her to follow a healthy and balanced diet reducing carbohydrates and sweets we will monitor diabetic labs in 3 to 4 months currently she will be on diet control         Relevant Orders    Comprehensive metabolic panel    Hemoglobin A1C    Albumin / creatinine urine ratio    Microalbumin, Random Urine (W/Creatinine) ([facility] ONLY)    TSH, 3rd generation with Free T4 reflex       Cardiovascular and Mediastinum    Primary hypertension     Patient reports me that more recently she is getting dizziness with bending down and having low blood pressure readings/low normal blood pressure readings she reports me that she has a history of whitecoat hypertension and that on the day of her stress test in the past the blood pressure was elevated losartan was added at that point in time and has been reduced but still continues to have ongoing low blood pressure and lightheadedness/dizziness with bending over. At this point time we will discontinue the losartan. I would like her to monitor her blood pressure twice daily if the blood pressure is greater than 140/90 to notify me and then we can reinitiate half the dose of losartan.          Mitral valve prolapse Reviewed previous records mild and currently asymptomatic            Musculoskeletal and Integument    Osteopenia of neck of femur     Currently on vitamin D/Citracal weightbearing exercises check a DEXA scan         Relevant Orders    DXA bone density spine hip and pelvis       Other    Mixed hyperlipidemia     Hyperlipidemia controlled continue with current medical regiment recommend a low-cholesterol diet and recommend routine exercise we will continue to monitor the progress. Continue Crestor 10 mg once daily         Vitamin D deficiency     We will check a vitamin D level         Relevant Orders    Vitamin D 25 hydroxy    Colon cancer screening    Relevant Orders    Ambulatory Referral to Gastroenterology   Other Visit Diagnoses     Need for hepatitis C screening test        Relevant Orders    Hepatitis C Antibody    Encounter for immunization        Encounter for screening mammogram for breast cancer        Relevant Orders    Mammo screening bilateral w 3d & cad    Asymptomatic menopausal state        Relevant Orders    DXA bone density spine hip and pelvis          Today we did discuss the risk benefits and side effects of baby aspirin preventatively the patient does have several risk factors including a brother with cardiovascular disease, elevation of the ASCVD risk score 13.3 along with the new diagnosis of diabetes; we have also discussed the potential risks and side effects of daily baby aspirin use including bleeding; with shared decision making we have decided to start her on a baby aspirin 81mg qod  Subjective:      Patient ID: Nicole Agudelo is a 75 y.o. female.     HPI six 59-year-old female coming in for a new patient visit type 2 diabetes, vitamin D deficiency, osteopenia, Raynaud's syndrome, primary hypertension, mixed hyperlipidemia and mitral valve prolapse/complex medical patient; reports me overall is stable and doing very well, here to review laboratories she has recently noticed symptoms of low blood pressure a after exercize low , dizzines , her average blood pressures have been in the low 100s and 90s when she bends over she has noticed dizziness. She reports me that when she comes into the doctor's office she has whitecoat hypertension this has been for a long time also reports me a stress test in the past showed an elevation of her blood pressure this is when she was started on losartan 50 mg once daily but shortly after starting it she started to develop dizziness and the dose of medication had cut down by 50%. She continues to have ongoing low blood pressure and dizziness. Diet good, walking , treadmill , rowing maching , short walk after dinner ,  No cp no sob,  Cousin   raynauds mild no skin infarctions. Worsens during the winter months. The following portions of the patient's history were reviewed and updated as appropriate: allergies, current medications, past family history, past medical history, past social history, past surgical history and problem list.    Review of Systems   Constitutional: Negative for activity change, appetite change and unexpected weight change. HENT: Negative for congestion and postnasal drip. Eyes: Negative for visual disturbance. Respiratory: Negative for cough and shortness of breath. Cardiovascular: Negative for chest pain. Gastrointestinal: Negative for abdominal pain, diarrhea, nausea and vomiting. Neurological: Positive for light-headedness. Negative for dizziness and headaches. Hematological: Negative for adenopathy. Objective:    Return in about 6 months (around 3/13/2024). No results found. No Known Allergies    Past Medical History:   Diagnosis Date   • Glaucoma    • Hypertension    • Hypothyroid    • Mitral valve prolapse    • Osteopenia    • Vitamin D deficiency      History reviewed. No pertinent surgical history.   Current Outpatient Medications on File Prior to Visit   Medication Sig Dispense Refill • aspirin 81 mg chewable tablet Chew 81 mg daily     • Calcium-Magnesium-Vitamin D (CITRACAL CALCIUM+D PO) Take 650 mg by mouth in the morning     • cholecalciferol (VITAMIN D3) 25 mcg (1,000 units) tablet Take 1,000 mcg by mouth 2 (two) times a day     • Coenzyme Q10 (Co Q10) 100 MG CAPS Take by mouth     • latanoprost (XALATAN) 0.005 % ophthalmic solution Administer 1 drop to both eyes in the morning     • Levoxyl 88 MCG tablet TAKE 1 TABLET BY MOUTH EVERY DAY 90 tablet 1   • rosuvastatin (CRESTOR) 10 MG tablet TAKE 1 TABLET BY MOUTH EVERY DAY 90 tablet 1     No current facility-administered medications on file prior to visit.      Family History   Problem Relation Age of Onset   • Diabetes Mother    • Stomach cancer Father    • Prostate cancer Brother      Social History     Socioeconomic History   • Marital status: /Civil Union     Spouse name: Not on file   • Number of children: Not on file   • Years of education: Not on file   • Highest education level: Not on file   Occupational History   • Not on file   Tobacco Use   • Smoking status: Never   • Smokeless tobacco: Never   Vaping Use   • Vaping Use: Never used   Substance and Sexual Activity   • Alcohol use: Yes     Comment: occ   • Drug use: Never   • Sexual activity: Not Currently   Other Topics Concern   • Not on file   Social History Narrative   • Not on file     Social Determinants of Health     Financial Resource Strain: Not on file   Food Insecurity: Not on file   Transportation Needs: Not on file   Physical Activity: Not on file   Stress: Not on file   Social Connections: Not on file   Intimate Partner Violence: Not on file   Housing Stability: Not on file     Vitals:    09/13/23 1309   BP: 130/74   Pulse: 58   Resp: 16   SpO2: 98%   Weight: 66.2 kg (146 lb)   Height: 5' 4" (1.626 m)     Results for orders placed or performed in visit on 07/31/23   CBC and differential   Result Value Ref Range    WBC 5.84 4.31 - 10.16 Thousand/uL    RBC 3.83 3.81 - 5.12 Million/uL    Hemoglobin 12.3 11.5 - 15.4 g/dL    Hematocrit 36.6 34.8 - 46.1 %    MCV 96 82 - 98 fL    MCH 32.1 26.8 - 34.3 pg    MCHC 33.6 31.4 - 37.4 g/dL    RDW 12.2 11.6 - 15.1 %    MPV 11.5 8.9 - 12.7 fL    Platelets 237 018 - 309 Thousands/uL    nRBC 0 /100 WBCs    Neutrophils Relative 41 (L) 43 - 75 %    Immat GRANS % 0 0 - 2 %    Lymphocytes Relative 48 (H) 14 - 44 %    Monocytes Relative 7 4 - 12 %    Eosinophils Relative 3 0 - 6 %    Basophils Relative 1 0 - 1 %    Neutrophils Absolute 2.39 1.85 - 7.62 Thousands/µL    Immature Grans Absolute 0.02 0.00 - 0.20 Thousand/uL    Lymphocytes Absolute 2.76 0.60 - 4.47 Thousands/µL    Monocytes Absolute 0.42 0.17 - 1.22 Thousand/µL    Eosinophils Absolute 0.17 0.00 - 0.61 Thousand/µL    Basophils Absolute 0.08 0.00 - 0.10 Thousands/µL   Comprehensive metabolic panel   Result Value Ref Range    Sodium 137 135 - 147 mmol/L    Potassium 4.7 3.5 - 5.3 mmol/L    Chloride 105 96 - 108 mmol/L    CO2 29 21 - 32 mmol/L    ANION GAP 3 mmol/L    BUN 9 5 - 25 mg/dL    Creatinine 0.94 0.60 - 1.30 mg/dL    Glucose, Fasting 134 (H) 65 - 99 mg/dL    Calcium 9.2 8.3 - 10.1 mg/dL    AST 11 5 - 45 U/L    ALT 18 12 - 78 U/L    Alkaline Phosphatase 70 46 - 116 U/L    Total Protein 7.6 6.4 - 8.4 g/dL    Albumin 3.8 3.5 - 5.0 g/dL    Total Bilirubin 0.46 0.20 - 1.00 mg/dL    eGFR 62 ml/min/1.73sq m   TSH, 3rd generation with Free T4 reflex   Result Value Ref Range    TSH 3RD GENERATON 2.521 0.450 - 4.500 uIU/mL   Lipid panel   Result Value Ref Range    Cholesterol 159 See Comment mg/dL    Triglycerides 147 See Comment mg/dL    HDL, Direct 58 >=50 mg/dL    LDL Calculated 72 0 - 100 mg/dL    Non-HDL-Chol (CHOL-HDL) 101 mg/dl   Hemoglobin A1C   Result Value Ref Range    Hemoglobin A1C 6.6 (H) Normal 4.0-5.6%; PreDiabetic 5.7-6.4%;  Diabetic >=6.5%; Glycemic control for adults with diabetes <7.0% %     mg/dl   Vitamin D 25 hydroxy   Result Value Ref Range    Vit D, 25-Hydroxy 34.1 30.0 - 100.0 ng/mL     Weight (last 2 days)     Date/Time Weight    09/13/23 1309 66.2 (146)        Body mass index is 25.06 kg/m². BP      Temp      Pulse     Resp      SpO2        Vitals:    09/13/23 1309   Weight: 66.2 kg (146 lb)     Vitals:    09/13/23 1309   Weight: 66.2 kg (146 lb)       /74   Pulse 58   Resp 16   Ht 5' 4" (1.626 m)   Wt 66.2 kg (146 lb)   SpO2 98%   BMI 25.06 kg/m²          Physical Exam  Vitals and nursing note reviewed. Constitutional:       General: She is not in acute distress. Appearance: Normal appearance. She is well-developed. She is not ill-appearing, toxic-appearing or diaphoretic. HENT:      Head: Normocephalic. Eyes:      General: No scleral icterus. Right eye: No discharge. Left eye: No discharge. Conjunctiva/sclera: Conjunctivae normal.      Pupils: Pupils are equal, round, and reactive to light. Cardiovascular:      Rate and Rhythm: Normal rate and regular rhythm. Heart sounds: Normal heart sounds. No murmur heard. No friction rub. No gallop. Pulmonary:      Effort: No respiratory distress. Breath sounds: Normal breath sounds. No wheezing or rales. Abdominal:      General: Bowel sounds are normal. There is no distension. Palpations: Abdomen is soft. There is no mass. Tenderness: There is no abdominal tenderness. There is no guarding or rebound. Musculoskeletal:         General: No deformity. Cervical back: Neck supple. Lymphadenopathy:      Cervical: No cervical adenopathy. Neurological:      Mental Status: She is alert.       Coordination: Coordination normal.

## 2023-09-14 PROBLEM — Z79.4 TYPE 2 DIABETES MELLITUS WITHOUT COMPLICATION, WITH LONG-TERM CURRENT USE OF INSULIN (HCC): Status: ACTIVE | Noted: 2023-09-14

## 2023-09-14 PROBLEM — Z12.11 COLON CANCER SCREENING: Status: ACTIVE | Noted: 2023-09-14

## 2023-09-14 PROBLEM — E11.9 TYPE 2 DIABETES MELLITUS WITHOUT COMPLICATION, WITH LONG-TERM CURRENT USE OF INSULIN (HCC): Status: ACTIVE | Noted: 2023-09-14

## 2023-09-14 NOTE — ASSESSMENT & PLAN NOTE
Lab Results   Component Value Date    HGBA1C 6.6 (H) 07/31/2023   New onset type 2 diabetes the explained to the patient she does meet the criteria for type 2 diabetes at this point in time I would like her to follow a healthy and balanced diet reducing carbohydrates and sweets we will monitor diabetic labs in 3 to 4 months currently she will be on diet control

## 2023-09-14 NOTE — ASSESSMENT & PLAN NOTE
Symptoms are mild in nature the patient does have a tendency to low blood pressure no infarction of the skin we will treat her conservatively wearing warm gloves/hand warmers during the winter months if there is progressive symptoms or infarction of the skin we can consider a BB dose of amlodipine

## 2023-09-14 NOTE — ASSESSMENT & PLAN NOTE
Hyperlipidemia controlled continue with current medical regiment recommend a low-cholesterol diet and recommend routine exercise we will continue to monitor the progress.   Continue Crestor 10 mg once daily

## 2023-09-14 NOTE — ASSESSMENT & PLAN NOTE
Patient reports me that more recently she is getting dizziness with bending down and having low blood pressure readings/low normal blood pressure readings she reports me that she has a history of whitecoat hypertension and that on the day of her stress test in the past the blood pressure was elevated losartan was added at that point in time and has been reduced but still continues to have ongoing low blood pressure and lightheadedness/dizziness with bending over. At this point time we will discontinue the losartan. I would like her to monitor her blood pressure twice daily if the blood pressure is greater than 140/90 to notify me and then we can reinitiate half the dose of losartan.

## 2023-09-15 DIAGNOSIS — Q27.8 ABNORMALITY OF SYSTEMIC VEIN: Primary | ICD-10-CM

## 2023-10-17 ENCOUNTER — FOLLOW UP (OUTPATIENT)
Dept: URBAN - METROPOLITAN AREA CLINIC 6 | Facility: CLINIC | Age: 68
End: 2023-10-17

## 2023-10-17 DIAGNOSIS — H25.13: ICD-10-CM

## 2023-10-17 DIAGNOSIS — H43.813: ICD-10-CM

## 2023-10-17 DIAGNOSIS — H40.1132: ICD-10-CM

## 2023-10-17 PROCEDURE — 92083 EXTENDED VISUAL FIELD XM: CPT

## 2023-10-17 PROCEDURE — 99214 OFFICE O/P EST MOD 30 MIN: CPT

## 2023-10-17 PROCEDURE — 76514 ECHO EXAM OF EYE THICKNESS: CPT

## 2023-10-17 ASSESSMENT — PACHYMETRY
OD_CT_UM: 521
OS_CT_UM: 544

## 2023-10-17 ASSESSMENT — TONOMETRY
OS_IOP_MMHG: 24
OD_IOP_MMHG: 18

## 2023-10-17 ASSESSMENT — VISUAL ACUITY
OD_CC: 20/20-1
OS_CC: 20/20

## 2023-10-18 ENCOUNTER — TELEPHONE (OUTPATIENT)
Age: 68
End: 2023-10-18

## 2023-10-18 NOTE — TELEPHONE ENCOUNTER
Patient calling back and wanted to know if ok to use Timolol eye drops, side effect is irregular heartbeat and was diagnosed with arrhythmia. Informed as per Dr Luis Alberto Madrigal is ok but is to check with cardiologist as well, patient states has appointment in 2 days with Cardio and will check at this time.

## 2023-10-18 NOTE — TELEPHONE ENCOUNTER
Eye doctor wants to put patient on a glaucoma eye drop and patient stated that she has a history of arrhythmia and want to know if Dr. Reagan Castillo would be ok with eye doctor giving her this. Eye drop name is  Timolol. 0.25% topical eye drop.  Please call eye doctor office with decision

## 2023-10-18 NOTE — TELEPHONE ENCOUNTER
Coleen Dean, DO   to [facility]       10/18/23  1:06 PM  I am okay with it she can also check with her cardiologist longterm with patient

## 2023-10-20 ENCOUNTER — OFFICE VISIT (OUTPATIENT)
Dept: CARDIOLOGY CLINIC | Facility: CLINIC | Age: 68
End: 2023-10-20
Payer: MEDICARE

## 2023-10-20 VITALS
BODY MASS INDEX: 25.69 KG/M2 | HEIGHT: 63 IN | SYSTOLIC BLOOD PRESSURE: 160 MMHG | HEART RATE: 67 BPM | DIASTOLIC BLOOD PRESSURE: 70 MMHG | OXYGEN SATURATION: 99 % | WEIGHT: 145 LBS

## 2023-10-20 DIAGNOSIS — I34.1 MITRAL VALVE PROLAPSE: ICD-10-CM

## 2023-10-20 DIAGNOSIS — I10 PRIMARY HYPERTENSION: ICD-10-CM

## 2023-10-20 DIAGNOSIS — E78.2 MIXED HYPERLIPIDEMIA: Primary | ICD-10-CM

## 2023-10-20 PROCEDURE — 99203 OFFICE O/P NEW LOW 30 MIN: CPT | Performed by: INTERNAL MEDICINE

## 2023-10-20 PROCEDURE — 93000 ELECTROCARDIOGRAM COMPLETE: CPT | Performed by: INTERNAL MEDICINE

## 2023-10-20 RX ORDER — LOSARTAN POTASSIUM 25 MG/1
25 TABLET ORAL DAILY
COMMUNITY

## 2023-10-20 NOTE — PROGRESS NOTES
Cardiology Consultation     Brando Cleary  [id number]  1955  HEART & VASCULAR [facility] [place]  [address] [address] [place] [id number]    Diagnoses and all orders for this visit:    Mixed hyperlipidemia    Primary hypertension  -     Ambulatory Referral to Cardiology    Mitral valve prolapse  -     Ambulatory Referral to Cardiology    Other orders  -     losartan (COZAAR) 25 mg tablet; Take 25 mg by mouth daily    I had the pleasure of seeing Brando Cleary for a consultation regarding HTN requested by     History of the Presenting Illness, Discussion/Summary and my Plan are as follows:::      Chris Herrmann is a pleasant 69-year-old lady with questionable hypertension-has whitecoat syndrome, no prior diagnosis of diabetes although she has a single-most recent A1c at 6.6-July 2023, dyslipidemia-at least for the last 10 to 20 years has been on a statin-previously simvastatin-no side effects, subsequently switched to rosuvastatin on which she is at this time. She also has hypothyroidism-is on Synthroid, her Raynaud's which in general is not much bothersome as well as glaucoma. She is a retired , remains active, going to the gym for 45 minutes to an hour almost daily-for example on the treadmill at a 3.5 mph pace using an incline of 45 without any limitations or symptoms. Recently vacationed in [place] in September 2023, getting up to 20,000 steps, on some days up to 34 floors without any limitations except for arthritis symptoms. She is mainly here to discuss about her candidacy for an antihypertensive medication  and that she has hypertension at all. She diligently checks her blood pressures at home, mostly in the 120s to 130s/70s while not on any medications.     Previously she once had a panic attack while undergoing a stress test in [facility] and since then developed whitecoat syndrome and it appears that her blood pressures go up by at least 20-30 points during medical visits. Family history: Father  of gastric cancer at 68, mother  at 80, was a diabetic, her sister older by 9 years has mitral prolapse, MRN needed and mitral repair    Medication history-  In the past she had tried losartan 25 mg daily but noticed that she felt dizzy after exercise with systolic blood pressure as low as 99 on occasion. This has not occurred recently however. She has tried metoprolol-probably at a dose of 25 mg daily and bradycardia to the 40s and felt symptomatic, no longer on it. Her most recent testing in [address] [place] [address] included an echocardiogram in  which was essentially unremarkable except for borderline mitral prolapse with mild MR, no LVH    Stress test 2022-8.2 METS, terminated early due to hypertensive response, baseline 168/88-peak 204/70, no ischemia    Cardiac exam was unremarkable except for elevated blood pressures-both arms 160/70    Plan:    Possible hypertension: I told her to keep a log of her blood pressures checking them twice a day and send me the log. She has so far taken 4 doses of losartan with the last dose being yesterday, advised her to start her log after 5 more days of staying off the losartan  Would like to check her blood pressures off any medications and then decide if she needs anything.   Options would be losartan/lisinopril since they also afford renal protection versus amlodipine since she also has Raynaud's  She feels that her Raynaud's is not bothersome and quite tolerable using hand warmers  In that case, could use losartan or lisinopril-since she has tolerated losartan well, could simply use 25 mg or 12.5 mg based on her blood pressures  Would not treat her whitecoat hypertension itself  Once she is on a stable dose of medication, advised her to check her blood pressures during exercise as well since her stress blood pressures might not be reliable since she has significant whitecoat hypertension. Recently she was advised timolol eyedrops for glaucoma and she wonders if these could cause bradycardia, systemic absorption can happen-can rarely cause bradycardia, she did not tolerate metoprolol probably at a dose of 25 mg daily in the past due to bradycardia but the only way she will know is to try it at night. She does wear a smart watch and can keep a close eye on her heart rates. Would allow heart rates into the 50s as long as she is not symptomatic    Last A1c of 6.6: If it remains persistently high, she would be in the diabetic range but would defer this to the primary care physician    Dyslipidemia: Not sure what her prior premedication lipid levels were but now well controlled. She did have a calcium score of 1 2 in  December 2021    Mild mitral valve prolapse: No significant murmurs on exam, remains asymptomatic, hold off on echocardiogram at this time, could consider it in 2024    Considering that she is completely asymptomatic at good levels of physical exertion, no need for stress testing at this time    She will send me her blood pressure log    Follow-up in 6 months           Latest Reference Range & Units 07/31/23 08:02   Cholesterol See Comment mg/dL 159   Triglycerides See Comment mg/dL 147   HDL >=50 mg/dL 58   Non-HDL Cholesterol mg/dl 101   LDL Calculated 0 - 100 mg/dL 72      Latest Reference Range & Units 07/31/23 08:02   Hemoglobin A1C  6.6 (H)   eAG, EST AVG Glucose mg/dl 143   TSH 3RD GENERATON 0.450 - 4.500 uIU/mL 2.521   (H): Data is abnormally high  1. Mixed hyperlipidemia        2. Primary hypertension  Ambulatory Referral to Cardiology      3.  Mitral valve prolapse  Ambulatory Referral to Cardiology        Patient Active Problem List   Diagnosis    Primary hypertension    Other specified hypothyroidism    Mitral valve prolapse    Mixed hyperlipidemia    Prediabetes    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Osteopenia of neck of femur    Rash Vitamin D deficiency    Glaucoma of both eyes    Acute pain of right shoulder    Type 2 diabetes mellitus without complication, with long-term current use of insulin (HCC)    Colon cancer screening     Past Medical History:   Diagnosis Date    Glaucoma     Hypertension     Hypothyroid     Mitral valve prolapse     Osteopenia     Vitamin D deficiency      Social History     Socioeconomic History    Marital status: /Civil Union     Spouse name: Not on file    Number of children: Not on file    Years of education: Not on file    Highest education level: Not on file   Occupational History    Not on file   Tobacco Use    Smoking status: Never    Smokeless tobacco: Never   Vaping Use    Vaping Use: Never used   Substance and Sexual Activity    Alcohol use: Yes     Comment: occ    Drug use: Never    Sexual activity: Not Currently   Other Topics Concern    Not on file   Social History Narrative    Not on file     Social Determinants of Health     Financial Resource Strain: Not on file   Food Insecurity: Not on file   Transportation Needs: Not on file   Physical Activity: Not on file   Stress: Not on file   Social Connections: Not on file   Intimate Partner Violence: Not on file   Housing Stability: Not on file      Family History   Problem Relation Age of Onset    Diabetes Mother     Stomach cancer Father     Prostate cancer Brother      No past surgical history on file.     Current Outpatient Medications:     aspirin 81 mg chewable tablet, Chew 81 mg every other day, Disp: , Rfl:     Calcium-Magnesium-Vitamin D (CITRACAL CALCIUM+D PO), Take 650 mg by mouth in the morning, Disp: , Rfl:     cholecalciferol (VITAMIN D3) 25 mcg (1,000 units) tablet, Take 1,000 mcg by mouth 2 (two) times a day, Disp: , Rfl:     Coenzyme Q10 (Co Q10) 100 MG CAPS, Take by mouth, Disp: , Rfl:     latanoprost (XALATAN) 0.005 % ophthalmic solution, Administer 1 drop to both eyes daily at bedtime, Disp: , Rfl:     Levoxyl 88 MCG tablet, TAKE 1 TABLET BY MOUTH EVERY DAY, Disp: 90 tablet, Rfl: 1    losartan (COZAAR) 25 mg tablet, Take 25 mg by mouth daily, Disp: , Rfl:     rosuvastatin (CRESTOR) 10 MG tablet, TAKE 1 TABLET BY MOUTH EVERY DAY, Disp: 90 tablet, Rfl: 1  No Known Allergies  Vitals:    10/20/23 0928   BP: 158/70   BP Location: Right arm   Patient Position: Sitting   Cuff Size: Standard   Pulse: 67   SpO2: 99%   Weight: 65.8 kg (145 lb)   Height: 5' 3" (1.6 m)         Imaging: No results found. Review of Systems:  Review of Systems   Constitutional: Negative. HENT: Negative. Eyes: Negative. Respiratory: Negative. Cardiovascular: Negative. Endocrine: Negative. Musculoskeletal: Negative. Physical Exam:    /70 (BP Location: Right arm, Patient Position: Sitting, Cuff Size: Standard)   Pulse 67   Ht 5' 3" (1.6 m)   Wt 65.8 kg (145 lb)   SpO2 99%   BMI 25.69 kg/m²   Physical Exam  Constitutional:       General: She is not in acute distress. Appearance: She is not ill-appearing. HENT:      Nose: Nose normal. No congestion or rhinorrhea. Neck:      Vascular: No carotid bruit. Cardiovascular:      Rate and Rhythm: Normal rate and regular rhythm. Heart sounds: No murmur heard. No friction rub. No gallop. Pulmonary:      Effort: Pulmonary effort is normal. No respiratory distress. Breath sounds: No stridor. No wheezing or rhonchi. Musculoskeletal:         General: No swelling, tenderness, deformity or signs of injury. Normal range of motion. Cervical back: Normal range of motion. No rigidity or tenderness. Lymphadenopathy:      Cervical: No cervical adenopathy. Skin:     General: Skin is warm. Coloration: Skin is not jaundiced or pale. Findings: No bruising or erythema. Neurological:      Mental Status: She is alert. This note was completed in part utilizing [employer] fluency direct voice recognition software.    Grammatical errors, random word insertion, spelling mistakes, occasional wrong word or "sound-alike" substitutions and incomplete sentences may be an occasional consequence of the system secondary to software limitations, ambient noise and hardware issues. At the time of dictation, efforts were made to edit, clarify and /or correct errors. Please read the chart carefully and recognize, using context, where substitutions have occurred. If you have any questions or concerns about the context, text or information contained within the body of this dictation, please contact myself, the provider, for further clarification.

## 2023-10-20 NOTE — PATIENT INSTRUCTIONS
Please check your blood pressures-in a seated posture, empty bladder; while you are relaxed, for at least for 5-10 minutes, preferably arm blood pressures-please check it twice daily for 1 week and send me the blood pressure log.     If you have started a new medication or there has been a change in the dose of a current medication, wait 1 week for the medication to take effect/BP to stablize, prior to assessing your blood pressures  Continue dietary and lifestyle changes, limiting salt intake

## 2023-11-03 DIAGNOSIS — I10 PRIMARY HYPERTENSION: Primary | ICD-10-CM

## 2023-11-03 RX ORDER — LOSARTAN POTASSIUM 25 MG/1
12.5 TABLET ORAL DAILY
Qty: 45 TABLET | Refills: 3 | Status: SHIPPED | OUTPATIENT
Start: 2023-11-03 | End: 2024-11-02

## 2023-11-03 NOTE — TELEPHONE ENCOUNTER
Spoke directly with Dr. Ave Pradhan in regards to instructions on blood pressure medication. Per Dr. Ave Pradhan to start on a low dose of losartan 12.5 mg one tablet daily due to most of pt's blood pressure is elevated. Spoke wit pt, advised pt of Dr. Antonio Montez recommendations. Pt will start losartan 12.5 mg daily and continue to keep a log of blood pressures. Will send rx to pt's pharmacy.

## 2023-11-03 NOTE — TELEPHONE ENCOUNTER
----- Message from Matt Lockett MD sent at 11/3/2023 12:33 PM EDT -----  Regarding: FW: Blood pressure log  Contact: [phone]    Can you please let her know to increase her losartan to 50 mg daily since they are almost all elevated, I think this dose should be enough for her. If she wants to continue diet and lifestyle while remaining on losartan 25, that is also acceptable, she should try to keep a log of it once she returns from her travel            ----- Message -----  From: Flor Olivas MA  Sent: 11/2/2023   8:59 AM EDT  To: Matt Lockett MD  Subject: FW: Blood pressure log                             ----- Message -----  From: Sukumar Sue Monster  Sent: 11/2/2023   8:28 AM EDT  To: Flor Olivas MA  Subject: FW: Blood pressure log                             ----- Message -----  From: Leihg Chilsd  Sent: 11/1/2023   8:25 PM EDT  To: Card[facility]  Subject: Blood pressure log                               Dr. Nerissa Gardner, as per your instruction, I am sending you the log of my bp starting from 5 days after stopping losartan. I have been travelling and not been able to stick to my regular diet or my exercise regimen. Also, I was not able to take my bp in the evenings every day. Please let me know if I need to take losartan again. Date. Time. BP.           HR  10/27.   7:00 am    135/88, 63  10/29.   7:00 am    127/85. 64  10/30.   7:00 am     140/88.  64  10/30.   4:00 pm    164/93 59  10/31.   7:15 am     146/86. 71, 123/81. 67  10/31     2:30 pm     143/80, 67, 134/83. 62  11/1.     7:00 am     142/91. 67, 140/87 64    Also, I used the timolol eyedrops for 4 days and this morning I felt a little out of breath and my pulse was 50-55. I informed Dr. Missy Hubbard and she asked me to discontinue its use. Thank you very much.   [employer]

## 2023-11-13 PROBLEM — Z12.11 COLON CANCER SCREENING: Status: RESOLVED | Noted: 2023-09-14 | Resolved: 2023-11-13

## 2023-11-21 ENCOUNTER — IOP CHECK (OUTPATIENT)
Dept: URBAN - METROPOLITAN AREA CLINIC 6 | Facility: CLINIC | Age: 68
End: 2023-11-21

## 2023-11-21 DIAGNOSIS — H25.13: ICD-10-CM

## 2023-11-21 DIAGNOSIS — H40.1132: ICD-10-CM

## 2023-11-21 DIAGNOSIS — H43.813: ICD-10-CM

## 2023-11-21 PROCEDURE — 99214 OFFICE O/P EST MOD 30 MIN: CPT

## 2023-11-21 ASSESSMENT — TONOMETRY
OS_IOP_MMHG: 21
OD_IOP_MMHG: 17
OD_IOP_MMHG: 18
OS_IOP_MMHG: 21

## 2023-11-21 ASSESSMENT — VISUAL ACUITY
OD_CC: 20/20
OS_CC: 20/20

## 2023-12-21 ENCOUNTER — HOSPITAL ENCOUNTER (OUTPATIENT)
Dept: RADIOLOGY | Age: 68
Discharge: HOME | End: 2023-12-21
Payer: MEDICARE

## 2023-12-21 VITALS — HEIGHT: 63 IN | WEIGHT: 141 LBS | BODY MASS INDEX: 24.98 KG/M2

## 2023-12-21 DIAGNOSIS — Z12.31 ENCOUNTER FOR SCREENING MAMMOGRAM FOR BREAST CANCER: ICD-10-CM

## 2023-12-21 PROCEDURE — 77067 SCR MAMMO BI INCL CAD: CPT

## 2023-12-21 PROCEDURE — 77063 BREAST TOMOSYNTHESIS BI: CPT

## 2024-01-08 ENCOUNTER — LAB (OUTPATIENT)
Dept: LAB | Age: 69
End: 2024-01-08
Payer: MEDICARE

## 2024-01-08 DIAGNOSIS — E55.9 VITAMIN D DEFICIENCY: ICD-10-CM

## 2024-01-08 DIAGNOSIS — Z11.59 NEED FOR HEPATITIS C SCREENING TEST: ICD-10-CM

## 2024-01-08 LAB — 25(OH)D3 SERPL-MCNC: 28.5 NG/ML (ref 30–100)

## 2024-01-08 PROCEDURE — 36415 COLL VENOUS BLD VENIPUNCTURE: CPT

## 2024-01-08 PROCEDURE — 82306 VITAMIN D 25 HYDROXY: CPT

## 2024-01-08 PROCEDURE — 86803 HEPATITIS C AB TEST: CPT

## 2024-01-09 LAB — HCV AB SER QL: NORMAL

## 2024-01-10 ENCOUNTER — IOP CHECK (OUTPATIENT)
Dept: URBAN - METROPOLITAN AREA CLINIC 6 | Facility: CLINIC | Age: 69
End: 2024-01-10

## 2024-01-10 ENCOUNTER — HOSPITAL ENCOUNTER (OUTPATIENT)
Dept: NON INVASIVE DIAGNOSTICS | Facility: CLINIC | Age: 69
Discharge: HOME | End: 2024-01-10
Payer: MEDICARE

## 2024-01-10 DIAGNOSIS — H43.813: ICD-10-CM

## 2024-01-10 DIAGNOSIS — H40.1132: ICD-10-CM

## 2024-01-10 DIAGNOSIS — H25.13: ICD-10-CM

## 2024-01-10 DIAGNOSIS — Q27.8 ABNORMALITY OF SYSTEMIC VEIN: ICD-10-CM

## 2024-01-10 PROCEDURE — 92012 INTRM OPH EXAM EST PATIENT: CPT

## 2024-01-10 PROCEDURE — 93971 EXTREMITY STUDY: CPT | Performed by: SURGERY

## 2024-01-10 PROCEDURE — 93971 EXTREMITY STUDY: CPT

## 2024-01-10 ASSESSMENT — VISUAL ACUITY
OS_CC: 20/20
OD_CC: 20/20

## 2024-01-10 ASSESSMENT — TONOMETRY
OD_IOP_MMHG: 15
OS_IOP_MMHG: 16

## 2024-02-16 ENCOUNTER — HOSPITAL ENCOUNTER (OUTPATIENT)
Dept: BONE DENSITY | Facility: MEDICAL CENTER | Age: 69
Discharge: HOME | End: 2024-02-16
Payer: MEDICARE

## 2024-02-16 DIAGNOSIS — Z78.0 ASYMPTOMATIC MENOPAUSAL STATE: ICD-10-CM

## 2024-02-16 DIAGNOSIS — M85.859 OSTEOPENIA OF NECK OF FEMUR, UNSPECIFIED LATERALITY: ICD-10-CM

## 2024-02-16 PROCEDURE — 77080 DXA BONE DENSITY AXIAL: CPT

## 2024-02-20 DIAGNOSIS — E78.2 MIXED HYPERLIPIDEMIA: ICD-10-CM

## 2024-02-20 DIAGNOSIS — E03.8 OTHER SPECIFIED HYPOTHYROIDISM: ICD-10-CM

## 2024-02-20 RX ORDER — ROSUVASTATIN CALCIUM 10 MG/1
10 TABLET, COATED ORAL DAILY
Qty: 90 TABLET | Refills: 1 | Status: SHIPPED | OUTPATIENT
Start: 2024-02-20

## 2024-02-20 RX ORDER — LEVOTHYROXINE SODIUM 88 UG/1
88 TABLET ORAL DAILY
Qty: 90 TABLET | Refills: 1 | Status: SHIPPED | OUTPATIENT
Start: 2024-02-20

## 2024-03-04 DIAGNOSIS — U07.1 COVID-19: Primary | ICD-10-CM

## 2024-03-04 RX ORDER — NIRMATRELVIR AND RITONAVIR 300-100 MG
3 KIT ORAL 2 TIMES DAILY
Qty: 30 TABLET | Refills: 0 | Status: SHIPPED | OUTPATIENT
Start: 2024-03-04 | End: 2024-03-09

## 2024-03-13 ENCOUNTER — APPOINTMENT (OUTPATIENT)
Dept: LAB | Age: 69
End: 2024-03-13
Payer: MEDICARE

## 2024-03-13 DIAGNOSIS — E11.9 TYPE 2 DIABETES MELLITUS WITHOUT COMPLICATION, WITH LONG-TERM CURRENT USE OF INSULIN (HCC): ICD-10-CM

## 2024-03-13 DIAGNOSIS — Z79.4 TYPE 2 DIABETES MELLITUS WITHOUT COMPLICATION, WITH LONG-TERM CURRENT USE OF INSULIN (HCC): ICD-10-CM

## 2024-03-13 LAB
ALBUMIN SERPL BCP-MCNC: 4.3 G/DL (ref 3.5–5)
ALP SERPL-CCNC: 63 U/L (ref 34–104)
ALT SERPL W P-5'-P-CCNC: 11 U/L (ref 7–52)
ANION GAP SERPL CALCULATED.3IONS-SCNC: 7 MMOL/L (ref 4–13)
AST SERPL W P-5'-P-CCNC: 15 U/L (ref 13–39)
BILIRUB SERPL-MCNC: 0.62 MG/DL (ref 0.2–1)
BUN SERPL-MCNC: 13 MG/DL (ref 5–25)
CALCIUM SERPL-MCNC: 9.5 MG/DL (ref 8.4–10.2)
CHLORIDE SERPL-SCNC: 101 MMOL/L (ref 96–108)
CO2 SERPL-SCNC: 28 MMOL/L (ref 21–32)
CREAT SERPL-MCNC: 0.9 MG/DL (ref 0.6–1.3)
CREAT UR-MCNC: 53.4 MG/DL
EST. AVERAGE GLUCOSE BLD GHB EST-MCNC: 143 MG/DL
GFR SERPL CREATININE-BSD FRML MDRD: 65 ML/MIN/1.73SQ M
GLUCOSE P FAST SERPL-MCNC: 129 MG/DL (ref 65–99)
HBA1C MFR BLD: 6.6 %
MICROALBUMIN UR-MCNC: <7 MG/L
MICROALBUMIN/CREAT 24H UR: <13 MG/G CREATININE (ref 0–30)
POTASSIUM SERPL-SCNC: 4.1 MMOL/L (ref 3.5–5.3)
PROT SERPL-MCNC: 7.4 G/DL (ref 6.4–8.4)
SODIUM SERPL-SCNC: 136 MMOL/L (ref 135–147)
TSH SERPL DL<=0.05 MIU/L-ACNC: 1.9 UIU/ML (ref 0.45–4.5)

## 2024-03-13 PROCEDURE — 82570 ASSAY OF URINE CREATININE: CPT

## 2024-03-13 PROCEDURE — 82043 UR ALBUMIN QUANTITATIVE: CPT

## 2024-03-13 PROCEDURE — 83036 HEMOGLOBIN GLYCOSYLATED A1C: CPT

## 2024-03-13 PROCEDURE — 80053 COMPREHEN METABOLIC PANEL: CPT

## 2024-03-13 PROCEDURE — 36415 COLL VENOUS BLD VENIPUNCTURE: CPT

## 2024-03-13 PROCEDURE — 84443 ASSAY THYROID STIM HORMONE: CPT

## 2024-04-01 ENCOUNTER — RA CDI HCC (OUTPATIENT)
Dept: OTHER | Facility: HOSPITAL | Age: 69
End: 2024-04-01

## 2024-04-03 RX ORDER — BRIMONIDINE TARTRATE 2 MG/ML
SOLUTION/ DROPS OPHTHALMIC
COMMUNITY
Start: 2023-11-22

## 2024-04-08 ENCOUNTER — OFFICE VISIT (OUTPATIENT)
Dept: INTERNAL MEDICINE CLINIC | Facility: CLINIC | Age: 69
End: 2024-04-08
Payer: MEDICARE

## 2024-04-08 ENCOUNTER — TELEPHONE (OUTPATIENT)
Dept: INTERNAL MEDICINE CLINIC | Facility: CLINIC | Age: 69
End: 2024-04-08

## 2024-04-08 VITALS
DIASTOLIC BLOOD PRESSURE: 78 MMHG | HEIGHT: 65 IN | SYSTOLIC BLOOD PRESSURE: 140 MMHG | HEART RATE: 59 BPM | BODY MASS INDEX: 23.86 KG/M2 | TEMPERATURE: 98.7 F | WEIGHT: 143.2 LBS | OXYGEN SATURATION: 99 %

## 2024-04-08 DIAGNOSIS — Z00.00 MEDICARE ANNUAL WELLNESS VISIT, SUBSEQUENT: ICD-10-CM

## 2024-04-08 DIAGNOSIS — E11.9 TYPE 2 DIABETES MELLITUS WITHOUT COMPLICATION, WITHOUT LONG-TERM CURRENT USE OF INSULIN (HCC): ICD-10-CM

## 2024-04-08 DIAGNOSIS — Z23 ENCOUNTER FOR IMMUNIZATION: Primary | ICD-10-CM

## 2024-04-08 DIAGNOSIS — Z12.11 SCREENING FOR COLON CANCER: ICD-10-CM

## 2024-04-08 DIAGNOSIS — M54.30 SCIATIC LEG PAIN: ICD-10-CM

## 2024-04-08 DIAGNOSIS — I10 PRIMARY HYPERTENSION: ICD-10-CM

## 2024-04-08 DIAGNOSIS — E11.9 TYPE 2 DIABETES MELLITUS WITHOUT COMPLICATION, WITHOUT LONG-TERM CURRENT USE OF INSULIN (HCC): Primary | ICD-10-CM

## 2024-04-08 DIAGNOSIS — N39.3 FEMALE STRESS INCONTINENCE: ICD-10-CM

## 2024-04-08 DIAGNOSIS — L98.9 SKIN LESION OF FACE: ICD-10-CM

## 2024-04-08 DIAGNOSIS — E78.2 MIXED HYPERLIPIDEMIA: ICD-10-CM

## 2024-04-08 PROBLEM — L94.0 REYNOLDS SYNDROME  (HCC): Status: RESOLVED | Noted: 2023-03-08 | Resolved: 2024-04-08

## 2024-04-08 PROBLEM — K74.3 REYNOLDS SYNDROME  (HCC): Status: RESOLVED | Noted: 2023-03-08 | Resolved: 2024-04-08

## 2024-04-08 PROBLEM — M54.32 SCIATICA OF LEFT SIDE: Status: ACTIVE | Noted: 2024-04-08

## 2024-04-08 PROCEDURE — 90677 PCV20 VACCINE IM: CPT | Performed by: INTERNAL MEDICINE

## 2024-04-08 PROCEDURE — G0439 PPPS, SUBSEQ VISIT: HCPCS | Performed by: INTERNAL MEDICINE

## 2024-04-08 PROCEDURE — G0009 ADMIN PNEUMOCOCCAL VACCINE: HCPCS | Performed by: INTERNAL MEDICINE

## 2024-04-08 PROCEDURE — 99214 OFFICE O/P EST MOD 30 MIN: CPT | Performed by: INTERNAL MEDICINE

## 2024-04-08 NOTE — TELEPHONE ENCOUNTER
"----- Message from Maximo Glez DO sent at 4/8/2024  2:06 PM EDT -----  Regarding: RE: DX CODE  Please notify patient I have updated labs, also I put an order in for her to undergo physical therapy regarding the sciatic pain  ----- Message -----  From: Malou Nguyen  Sent: 4/8/2024  12:13 PM EDT  To: Maximo Glez DO; #  Subject: DX CODE                                          NAVJOT Nye code on pt's lab orders from today say \"Type 2 diabetes mellitus without complication, with long-term current use of insulin\" pt has never used insulin, requesting a code change please      "

## 2024-04-08 NOTE — ASSESSMENT & PLAN NOTE
Will have patient go for a screening colonoscopy  Assessment and plan 1.  Medicare subsequent annual wellness examination overall the patient is clinically stable and doing well, we encouraged the patient to follow a healthy and balanced diet.  We recommend that the patient exercise routinely approximately 30 minutes 5 times per week .  We have reviewed the patient's vaccines and have made recommendations for updates if necessary annual flu shot     .  We will be ordering screening laboratories which are age appropriate.  Return to the office in 6 months    call if any problems.

## 2024-04-08 NOTE — PROGRESS NOTES
Assessment and Plan:     Problem List Items Addressed This Visit        Cardiovascular and Mediastinum    Primary hypertension     Clinically stable and doing well continue the current medical regiment will continue monitor.  Patient does have an element of whitecoat hypertension Cozaar had to be reduced to half a tablet once daily she takes this at nighttime she has noticed a drop in blood pressure after exercise did ask her to consider taking her Cozaar 1 hour after exercise to see if this helps with the reduction in systolic blood pressure after exercise.  Also to drink 4 to 6 glasses of water could consider 1 bottle of propel water continue to monitor            Endocrine    Type 2 diabetes mellitus without complication, without long-term current use of insulin ([facility])       Lab Results   Component Value Date    HGBA1C 6.6 (H) 03/13/2024   I have counselled the pt to follow a healthy and balanced diet ,and recommend routine exercise.  I will be ordering diabetic laboratories including comprehensive metabolic panel, hemoglobin A1c, urine microalbumin, lipid panel.  Currently diet controlled not on medication annual eye examination recommended         Relevant Orders    Comprehensive metabolic panel    Hemoglobin A1C    Lipid Panel with Direct LDL reflex    Albumin / creatinine urine ratio       Musculoskeletal and Integument    Skin lesion of face     Will have patient meet with dermatology for further evaluation         Relevant Orders    Ambulatory Referral to Dermatology       Urinary    Female stress incontinence     Recommend Kebernard's exercise, exercise worksheet provided symptoms are mild             Other    Medicare annual wellness visit, subsequent     Will have patient go for a screening colonoscopy  Assessment and plan 1.  Medicare subsequent annual wellness examination overall the patient is clinically stable and doing well, we encouraged the patient to follow a healthy and balanced diet.  We recommend  that the patient exercise routinely approximately 30 minutes 5 times per week .  We have reviewed the patient's vaccines and have made recommendations for updates if necessary annual flu shot     .  We will be ordering screening laboratories which are age appropriate.  Return to the office in 6 months    call if any problems.         Mixed hyperlipidemia     Hyperlipidemia controlled continue with current medical regiment recommend a low-cholesterol diet and recommend routine exercise we will continue to monitor the progress.  Continue Crestor 10 mg once daily        Other Visit Diagnoses     Encounter for immunization    -  Primary    Relevant Orders    Pneumococcal Conjugate Vaccine 20-valent (Pcv20) (Completed)    Sciatic leg pain        Relevant Orders    Ambulatory Referral to Physical Therapy      RTO in 6 months call with any problems, consider completion of living living will    Depression Screening and Follow-up Plan: Patient was screened for depression during today's encounter. They screened negative with a PHQ-2 score of 0.    Urinary Incontinence Plan of Care: counseling topics discussed: practice Kegel (pelvic floor strengthening) exercises.       Preventive health issues were discussed with patient, and age appropriate screening tests were ordered as noted in patient's After Visit Summary.  Personalized health advice and appropriate referrals for health education or preventive services given if needed, as noted in patient's After Visit Summary.     History of Present Illness:     Patient presents for a Medicare Wellness Visit    HPI 68-year old female coming in for a follow up office visit regarding female stress incontinence, skin lesion of the face, type 2 diabetes, sciatic leg pain, hypertension and hyperlipidemia; the patient reports me compliant taking medications without untoward side effects the.  The patient is here to review his medical condition, update me on the medical condition and the  patient reports me no hospitalizations and no ER visits.  No injuries no illnesses probably healthy and balanced diet here to review laboratories in detail patient does report to me a sciatic type of lower back left hip and left leg pain that radiates to the left leg above the knee.  Symptoms are improving with stretching.  Patient reports to me mild stress incontinence.  Patient has changed the time of taking Cozaar to at nighttime half a tablet she has noticed some minimal dizziness after exercise and her systolic blood pressure is in the upper 90s for several minutes; bp home 1/2 tab cozaar less dizzy and bp is 120-130/70- dark spots on the face  Patient Care Team:  Maximo Glez DO as PCP - General (Internal Medicine)     Review of Systems:     Review of Systems   Constitutional:  Negative for activity change, appetite change and unexpected weight change.   HENT:  Negative for congestion and postnasal drip.    Eyes:  Negative for visual disturbance.   Respiratory:  Negative for cough and shortness of breath.    Cardiovascular:  Negative for chest pain.   Gastrointestinal:  Negative for abdominal pain, diarrhea, nausea and vomiting.   Musculoskeletal:         Left sciatica   Neurological:  Negative for dizziness, light-headedness and headaches.   Hematological:  Negative for adenopathy.   Facial skin lesion     Problem List:     Patient Active Problem List   Diagnosis   • Primary hypertension   • Other specified hypothyroidism   • Medicare annual wellness visit, subsequent   • Mitral valve prolapse   • Mixed hyperlipidemia   • Prediabetes   • Osteopenia of neck of femur   • Rash   • Vitamin D deficiency   • Glaucoma of both eyes   • Acute pain of right shoulder   • Type 2 diabetes mellitus without complication, without long-term current use of insulin (HCC)   • Female stress incontinence   • Skin lesion of face   • Sciatica of left side      Past Medical and Surgical History:     Past Medical History:    Diagnosis Date   • Glaucoma    • Hypertension    • Hypothyroid    • Mitral valve prolapse    • Osteopenia    • Vitamin D deficiency      History reviewed. No pertinent surgical history.   Family History:     Family History   Problem Relation Age of Onset   • Diabetes Mother    • Stomach cancer Father 74   • No Known Problems Sister    • No Known Problems Daughter    • No Known Problems Maternal Grandmother    • No Known Problems Maternal Grandfather    • No Known Problems Paternal Grandmother    • No Known Problems Paternal Grandfather    • Prostate cancer Brother 68   • No Known Problems Son    • No Known Problems Paternal Aunt    • Kidney cancer Maternal Uncle 61      Social History:     Social History     Socioeconomic History   • Marital status: /Civil Union     Spouse name: None   • Number of children: None   • Years of education: None   • Highest education level: None   Occupational History   • None   Tobacco Use   • Smoking status: Never   • Smokeless tobacco: Never   Vaping Use   • Vaping status: Never Used   Substance and Sexual Activity   • Alcohol use: Yes     Comment: occ   • Drug use: Never   • Sexual activity: Not Currently   Other Topics Concern   • None   Social History Narrative   • None     Social Determinants of Health     Financial Resource Strain: Not on file   Food Insecurity: No Food Insecurity (4/8/2024)    Hunger Vital Sign    • Worried About Running Out of Food in the Last Year: Never true    • Ran Out of Food in the Last Year: Never true   Transportation Needs: No Transportation Needs (4/8/2024)    PRAPARE - Transportation    • Lack of Transportation (Medical): No    • Lack of Transportation (Non-Medical): No   Physical Activity: Not on file   Stress: Not on file   Social Connections: Not on file   Intimate Partner Violence: Not on file   Housing Stability: Low Risk  (4/8/2024)    Housing Stability Vital Sign    • Unable to Pay for Housing in the Last Year: No    • Number of  Places Lived in the Last Year: 1    • Unstable Housing in the Last Year: No      Medications and Allergies:     Current Outpatient Medications   Medication Sig Dispense Refill   • aspirin 81 mg chewable tablet Chew 81 mg every other day     • brimonidine tartrate 0.2 % ophthalmic solution      • Calcium-Magnesium-Vitamin D (CITRACAL CALCIUM+D PO) Take 650 mg by mouth in the morning     • cholecalciferol (VITAMIN D3) 25 mcg (1,000 units) tablet Take 1,000 mcg by mouth 2 (two) times a day     • Coenzyme Q10 (Co Q10) 100 MG CAPS Take by mouth     • latanoprost (XALATAN) 0.005 % ophthalmic solution Administer 1 drop to both eyes daily at bedtime     • Levoxyl 88 MCG tablet TAKE 1 TABLET BY MOUTH EVERY DAY 90 tablet 1   • losartan (COZAAR) 25 mg tablet Take 0.5 tablets (12.5 mg total) by mouth daily 45 tablet 3   • rosuvastatin (CRESTOR) 10 MG tablet TAKE 1 TABLET BY MOUTH EVERY DAY 90 tablet 1     No current facility-administered medications for this visit.     No Known Allergies   Immunizations:     Immunization History   Administered Date(s) Administered   • COVID-19 Moderna Vac BIVALENT 12 Yr+ IM 0.5 ML 08/22/2023   • INFLUENZA 10/30/2023   • Pneumococcal Conjugate Vaccine 20-valent (Pcv20), Polysace 04/08/2024   • Respiratory Syncytial Virus Vaccine (Recombinant, Adjuvanted) 01/18/2024   • Zoster Vaccine Recombinant 02/22/2024      Health Maintenance:         Topic Date Due   • Colorectal Cancer Screening  12/05/2024 (Originally 11/26/2000)   • Breast Cancer Screening: Mammogram  12/21/2024   • DXA SCAN  02/16/2029   • Hepatitis C Screening  Completed         Topic Date Due   • Hepatitis A Vaccine (1 of 2 - Risk 2-dose series) Never done   • Hepatitis B Vaccine (1 of 3 - Risk 3-dose series) Never done   • COVID-19 Vaccine (2 - 2023-24 season) 10/17/2023      Medicare Screening Tests and Risk Assessments:     Haris is here for her Subsequent Wellness visit. Last Medicare Wellness visit information reviewed,  patient interviewed and updates made to the record today.      Health Risk Assessment:   Patient rates overall health as good. Patient feels that their physical health rating is same. Patient is satisfied with their life. Eyesight was rated as slightly worse. Hearing was rated as same. Patient feels that their emotional and mental health rating is same. Patients states they are sometimes angry. Patient states they are sometimes unusually tired/fatigued. Pain experienced in the last 7 days has been some. Patient's pain rating has been 2/10. Patient states that she has experienced no weight loss or gain in last 6 months. Left hip pain siatica  exercise is some help worse with bending and walking fast  with turning at night able to do ithe adls  mid back pain and with the dishes exerxise class with lift  legs     Depression Screening:   PHQ-2 Score: 0      Fall Risk Screening:   In the past year, patient has experienced: no history of falling in past year      Urinary Incontinence Screening:   Patient has leaked urine accidently in the last six months. Occasional, when sneezing- stress inc    Home Safety:  Patient does not have trouble with stairs inside or outside of their home. Patient has working smoke alarms and has working carbon monoxide detector. Home safety hazards include: none.     Nutrition:   Current diet is Regular, Low Saturated Fat and Limited junk food.     Medications:   Patient is currently taking over-the-counter supplements. OTC medications include: see medication list. Patient is able to manage medications.     Activities of Daily Living (ADLs)/Instrumental Activities of Daily Living (IADLs):   Walk and transfer into and out of bed and chair?: Yes  Dress and groom yourself?: Yes    Bathe or shower yourself?: Yes    Feed yourself? Yes  Do your laundry/housekeeping?: Yes  Manage your money, pay your bills and track your expenses?: Yes  Make your own meals?: Yes    Do your own shopping?:  "Yes    Previous Hospitalizations:   Any hospitalizations or ED visits within the last 12 months?: No      Advance Care Planning:   Living will: No    Durable POA for healthcare: No    Advanced directive: No      Cognitive Screening:   Provider or family/friend/caregiver concerned regarding cognition?: No    PREVENTIVE SCREENINGS      Cardiovascular Screening:    General: Screening Not Indicated and History Lipid Disorder      Diabetes Screening:     General: Screening Not Indicated and History Diabetes      Breast Cancer Screening:     General: Screening Current      Cervical Cancer Screening:    General: Screening Not Indicated      Osteoporosis Screening:    General: Screening Current      Lung Cancer Screening:     General: Screening Not Indicated      Hepatitis C Screening:    General: Screening Current    Screening, Brief Intervention, and Referral to Treatment (SBIRT)    Screening  Typical number of drinks in a day: 0  Typical number of drinks in a week: 0  Interpretation: Low risk drinking behavior.    AUDIT-C Screenin) How often did you have a drink containing alcohol in the past year? monthly or less  2) How many drinks did you have on a typical day when you were drinking in the past year? 1 to 2  3) How often did you have 6 or more drinks on one occasion in the past year? never    AUDIT-C Score: 1  Interpretation: Score 0-2 (female): Negative screen for alcohol misuse    Single Item Drug Screening:  How often have you used an illegal drug (including marijuana) or a prescription medication for non-medical reasons in the past year? never    Single Item Drug Screen Score: 0  Interpretation: Negative screen for possible drug use disorder    No results found.     Physical Exam:     /78 (BP Location: Left arm, Patient Position: Sitting, Cuff Size: Large)   Pulse 59   Temp 98.7 °F (37.1 °C) (Probe)   Ht 5' 4.57\" (1.64 m)   Wt 65 kg (143 lb 3.2 oz)   SpO2 99%   BMI 24.15 kg/m²     Physical " Exam  Vitals and nursing note reviewed.   Constitutional:       General: She is not in acute distress.     Appearance: Normal appearance. She is well-developed and normal weight. She is not ill-appearing, toxic-appearing or diaphoretic.   HENT:      Head: Normocephalic and atraumatic.      Right Ear: External ear normal.      Left Ear: External ear normal.      Nose: Nose normal.   Eyes:      Pupils: Pupils are equal, round, and reactive to light.   Cardiovascular:      Rate and Rhythm: Normal rate and regular rhythm.      Heart sounds: Normal heart sounds. No murmur heard.  Pulmonary:      Effort: Pulmonary effort is normal.      Breath sounds: Normal breath sounds.   Abdominal:      General: There is no distension.      Palpations: Abdomen is soft.      Tenderness: There is no abdominal tenderness. There is no guarding.   Neurological:      Mental Status: She is alert.     Macular dark circumscribed single lesion veronica Glez DO

## 2024-04-08 NOTE — ASSESSMENT & PLAN NOTE
Lab Results   Component Value Date    HGBA1C 6.6 (H) 03/13/2024   I have counselled the pt to follow a healthy and balanced diet ,and recommend routine exercise.  I will be ordering diabetic laboratories including comprehensive metabolic panel, hemoglobin A1c, urine microalbumin, lipid panel.  Currently diet controlled not on medication annual eye examination recommended

## 2024-04-08 NOTE — PATIENT INSTRUCTIONS
Medicare Preventive Visit Patient Instructions  Thank you for completing your Welcome to Medicare Visit or Medicare Annual Wellness Visit today. Your next wellness visit will be due in one year (4/9/2025).  The screening/preventive services that you may require over the next 5-10 years are detailed below. Some tests may not apply to you based off risk factors and/or age. Screening tests ordered at today's visit but not completed yet may show as past due. Also, please note that scanned in results may not display below.  Preventive Screenings:  Service Recommendations Previous Testing/Comments   Colorectal Cancer Screening  * Colonoscopy    * Fecal Occult Blood Test (FOBT)/Fecal Immunochemical Test (FIT)  * Fecal DNA/Cologuard Test  * Flexible Sigmoidoscopy Age: 45-75 years old   Colonoscopy: every 10 years (may be performed more frequently if at higher risk)  OR  FOBT/FIT: every 1 year  OR  Cologuard: every 3 years  OR  Sigmoidoscopy: every 5 years  Screening may be recommended earlier than age 45 if at higher risk for colorectal cancer. Also, an individualized decision between you and your healthcare provider will decide whether screening between the ages of 76-85 would be appropriate. Colonoscopy: Not on file  FOBT/FIT: Not on file  Cologuard: Not on file  Sigmoidoscopy: Not on file          Breast Cancer Screening Age: 40+ years old  Frequency: every 1-2 years  Not required if history of left and right mastectomy Mammogram: 12/21/2023    Screening Current   Cervical Cancer Screening Between the ages of 21-29, pap smear recommended once every 3 years.   Between the ages of 30-65, can perform pap smear with HPV co-testing every 5 years.   Recommendations may differ for women with a history of total hysterectomy, cervical cancer, or abnormal pap smears in past. Pap Smear: Not on file    Screening Not Indicated   Hepatitis C Screening Once for adults born between 1945 and 1965  More frequently in patients at high risk  for Hepatitis C Hep C Antibody: 01/08/2024    Screening Current   Diabetes Screening 1-2 times per year if you're at risk for diabetes or have pre-diabetes Fasting glucose: 129 mg/dL (3/13/2024)  A1C: 6.6 % (3/13/2024)  Screening Not Indicated  History Diabetes   Cholesterol Screening Once every 5 years if you don't have a lipid disorder. May order more often based on risk factors. Lipid panel: 07/31/2023    Screening Not Indicated  History Lipid Disorder     Other Preventive Screenings Covered by Medicare:  Abdominal Aortic Aneurysm (AAA) Screening: covered once if your at risk. You're considered to be at risk if you have a family history of AAA.  Lung Cancer Screening: covers low dose CT scan once per year if you meet all of the following conditions: (1) Age 55-77; (2) No signs or symptoms of lung cancer; (3) Current smoker or have quit smoking within the last 15 years; (4) You have a tobacco smoking history of at least 20 pack years (packs per day multiplied by number of years you smoked); (5) You get a written order from a healthcare provider.  Glaucoma Screening: covered annually if you're considered high risk: (1) You have diabetes OR (2) Family history of glaucoma OR (3)  aged 50 and older OR (4)  American aged 65 and older  Osteoporosis Screening: covered every 2 years if you meet one of the following conditions: (1) You're estrogen deficient and at risk for osteoporosis based off medical history and other findings; (2) Have a vertebral abnormality; (3) On glucocorticoid therapy for more than 3 months; (4) Have primary hyperparathyroidism; (5) On osteoporosis medications and need to assess response to drug therapy.   Last bone density test (DXA Scan): 02/20/2024.  HIV Screening: covered annually if you're between the age of 15-65. Also covered annually if you are younger than 15 and older than 65 with risk factors for HIV infection. For pregnant patients, it is covered up to 3 times  per pregnancy.    Immunizations:  Immunization Recommendations   Influenza Vaccine Annual influenza vaccination during flu season is recommended for all persons aged >= 6 months who do not have contraindications   Pneumococcal Vaccine   * Pneumococcal conjugate vaccine = PCV13 (Prevnar 13), PCV15 (Vaxneuvance), PCV20 (Prevnar 20)  * Pneumococcal polysaccharide vaccine = PPSV23 (Pneumovax) Adults 19-63 yo with certain risk factors or if 65+ yo  If never received any pneumonia vaccine: recommend Prevnar 20 (PCV20)  Give PCV20 if previously received 1 dose of PCV13 or PPSV23   Hepatitis B Vaccine 3 dose series if at intermediate or high risk (ex: diabetes, end stage renal disease, liver disease)   Respiratory syncytial virus (RSV) Vaccine - COVERED BY MEDICARE PART D  * RSVPreF3 (Arexvy) CDC recommends that adults 60 years of age and older may receive a single dose of RSV vaccine using shared clinical decision-making (SCDM)   Tetanus (Td) Vaccine - COST NOT COVERED BY MEDICARE PART B Following completion of primary series, a booster dose should be given every 10 years to maintain immunity against tetanus. Td may also be given as tetanus wound prophylaxis.   Tdap Vaccine - COST NOT COVERED BY MEDICARE PART B Recommended at least once for all adults. For pregnant patients, recommended with each pregnancy.   Shingles Vaccine (Shingrix) - COST NOT COVERED BY MEDICARE PART B  2 shot series recommended in those 19 years and older who have or will have weakened immune systems or those 50 years and older     Health Maintenance Due:      Topic Date Due   • Colorectal Cancer Screening  12/05/2024 (Originally 11/26/1999)   • Breast Cancer Screening: Mammogram  12/21/2024   • DXA SCAN  02/16/2029   • Hepatitis C Screening  Completed     Immunizations Due:      Topic Date Due   • Pneumococcal Vaccine: 65+ Years (1 of 2 - PCV) Never done   • Hepatitis A Vaccine (1 of 2 - Risk 2-dose series) Never done   • Hepatitis B Vaccine (1 of 3  - Risk 3-dose series) Never done   • COVID-19 Vaccine (2 - 2023-24 season) 10/17/2023     Advance Directives   What are advance directives?  Advance directives are legal documents that state your wishes and plans for medical care. These plans are made ahead of time in case you lose your ability to make decisions for yourself. Advance directives can apply to any medical decision, such as the treatments you want, and if you want to donate organs.   What are the types of advance directives?  There are many types of advance directives, and each state has rules about how to use them. You may choose a combination of any of the following:  Living will:  This is a written record of the treatment you want. You can also choose which treatments you do not want, which to limit, and which to stop at a certain time. This includes surgery, medicine, IV fluid, and tube feedings.   Durable power of  for healthcare (DPAHC):  This is a written record that states who you want to make healthcare choices for you when you are unable to make them for yourself. This person, called a proxy, is usually a family member or a friend. You may choose more than 1 proxy.  Do not resuscitate (DNR) order:  A DNR order is used in case your heart stops beating or you stop breathing. It is a request not to have certain forms of treatment, such as CPR. A DNR order may be included in other types of advance directives.  Medical directive:  This covers the care that you want if you are in a coma, near death, or unable to make decisions for yourself. You can list the treatments you want for each condition. Treatment may include pain medicine, surgery, blood transfusions, dialysis, IV or tube feedings, and a ventilator (breathing machine).  Values history:  This document has questions about your views, beliefs, and how you feel and think about life. This information can help others choose the care that you would choose.  Why are advance directives  important?  An advance directive helps you control your care. Although spoken wishes may be used, it is better to have your wishes written down. Spoken wishes can be misunderstood, or not followed. Treatments may be given even if you do not want them. An advance directive may make it easier for your family to make difficult choices about your care.   Urinary Incontinence   Urinary incontinence (UI)  is when you lose control of your bladder. UI develops because your bladder cannot store or empty urine properly. The 3 most common types of UI are stress incontinence, urge incontinence, or both.  Medicines:   May be given to help strengthen your bladder control. Report any side effects of medication to your healthcare provider.  Do pelvic muscle exercises often:  Your pelvic muscles help you stop urinating. Squeeze these muscles tight for 5 seconds, then relax for 5 seconds. Gradually work up to squeezing for 10 seconds. Do 3 sets of 15 repetitions a day, or as directed. This will help strengthen your pelvic muscles and improve bladder control.  Train your bladder:  Go to the bathroom at set times, such as every 2 hours, even if you do not feel the urge to go. You can also try to hold your urine when you feel the urge to go. For example, hold your urine for 5 minutes when you feel the urge to go. As that becomes easier, hold your urine for 10 minutes.   Self-care:   Keep a UI record.  Write down how often you leak urine and how much you leak. Make a note of what you were doing when you leaked urine.  Drink liquids as directed. You may need to limit the amount of liquid you drink to help control your urine leakage. Do not drink any liquid right before you go to bed. Limit or do not have drinks that contain caffeine or alcohol.   Prevent constipation.  Eat a variety of high-fiber foods. Good examples are high-fiber cereals, beans, vegetables, and whole-grain breads. Walking is the best way to trigger your intestines to  have a bowel movement.  Exercise regularly and maintain a healthy weight.  Weight loss and exercise will decrease pressure on your bladder and help you control your leakage.   Use a catheter as directed  to help empty your bladder. A catheter is a tiny, plastic tube that is put into your bladder to drain your urine.   Go to behavior therapy as directed.  Behavior therapy may be used to help you learn to control your urge to urinate.     © Copyright [employer] 2018 Information is for End User's use only and may not be sold, redistributed or otherwise used for commercial purposes. All illustrations and images included in CareNotes® are the copyrighted property of [employer] or [employer]      Kegel Exercises for Women   AMBULATORY CARE:   Kegel exercises  help strengthen your pelvic muscles. Pelvic muscles hold your pelvic organs, such as your bladder and uterus, in place. Kegel exercises help prevent or control certain conditions, such as urine incontinence (leakage) or uterine prolapse.       Call your doctor or physical therapist if:   You cannot feel your muscles tighten or relax.    You continue to leak urine.    You have questions or concerns about your condition or care.    Use the correct muscles:  Pelvic muscles are the muscles you use to control urine flow. To target these muscles, stop and start the flow of urine several times. This will help you become familiar with how it feels to tighten and relax these muscles.  How to do Kegel exercises:   Get into a comfortable position.  You may lie down, stand up, or sit down to do these exercises. When you first try to do these exercises, it may be easier if you lie down.    Tighten or squeeze your pelvic muscles slowly.  It may feel like you are trying to hold back urine or gas. Hold this position for 3 seconds. Relax for 3 seconds. Repeat this cycle 10 times. Do not hold your breath when you do Kegel exercises. Keep your stomach, back, and leg  muscles relaxed.    Do 10 sets of Kegel exercises, at least 3 times a day.  When you know how to do Kegel exercises, use different positions. This will help to strengthen your pelvic muscles as much as possible. You can do these exercises while you lie on the floor, watch TV, or while you stand. Tighten your pelvic muscles before you sneeze, cough, or lift to prevent urine leakage. You may notice improved bladder control within about 6 weeks.    Follow up with your doctor or physical therapist as directed:  Write down your questions so you remember to ask them during your visits.  © Copyright [employer] 2023 Information is for End User's use only and may not be sold, redistributed or otherwise used for commercial purposes.  The above information is an  only. It is not intended as medical advice for individual conditions or treatments. Talk to your doctor, nurse or pharmacist before following any medical regimen to see if it is safe and effective for you.

## 2024-04-08 NOTE — ASSESSMENT & PLAN NOTE
Clinically stable and doing well continue the current medical regiment will continue monitor.  Patient does have an element of whitecoat hypertension Cozaar had to be reduced to half a tablet once daily she takes this at nighttime she has noticed a drop in blood pressure after exercise did ask her to consider taking her Cozaar 1 hour after exercise to see if this helps with the reduction in systolic blood pressure after exercise.  Also to drink 4 to 6 glasses of water could consider 1 bottle of propel water continue to monitor

## 2024-05-01 ENCOUNTER — EVALUATION (OUTPATIENT)
Dept: PHYSICAL THERAPY | Age: 69
End: 2024-05-01
Payer: MEDICARE

## 2024-05-01 DIAGNOSIS — M54.30 SCIATIC LEG PAIN: Primary | ICD-10-CM

## 2024-05-01 PROCEDURE — 97162 PT EVAL MOD COMPLEX 30 MIN: CPT | Performed by: PHYSICAL THERAPIST

## 2024-05-01 NOTE — PROGRESS NOTES
PT Evaluation     Today's date: 2024  Patient name: Haris Chirinos  : 1955  MRN: [id number]  Referring provider: Maximo Glez DO  Dx:   Encounter Diagnosis     ICD-10-CM    1. Sciatic leg pain  M54.30 Ambulatory Referral to Physical Therapy                     Assessment  Assessment details: Patient seen for PT re-assessment. Patient presents with + SI provacation tests L>R, no leg length discrepancy, radicular sx into L LE stopping at the knee, decreased hip ER ROM L>R, patient unable to perform her prior exercise program without increase in LB and LE pain. PT initiated treatment, HEP initiation for core stabilization.     Patient is an excellent candidate for PT. Recommending 1-2x/week x 6-8 weeks.   Impairments: abnormal gait, abnormal or restricted ROM, activity intolerance, lacks appropriate home exercise program, pain with function, poor posture  and poor body mechanics  Functional limitations: Back pain with bending, lifting, with current fitness program, pain with IADLs and recreational activitiesUnderstanding of Dx/Px/POC: good   Prognosis: good    Goals  STG to be met in 4 weeks  - Patient to be able to tolerate HEP without increase in L LE pain  - Reduce L LE radicular sx 50%    LTG to be met in 8 weeks  - Improve core stabilization and LE flexibility to reduce back and LE pain with patient's return to fitness program.  - Patient to be able to tolerate IADL type activity without increase in back pain.    Plan  Patient would benefit from: skilled physical therapy  Planned modality interventions: cryotherapy and thermotherapy: hydrocollator packs  Planned therapy interventions: abdominal trunk stabilization, joint mobilization, IASTM, kinesiology taping, manual therapy, neuromuscular re-education, patient education, postural training, strengthening, stretching, therapeutic activities, therapeutic exercise, home exercise program, gait training, balance and body mechanics  training  Frequency: 2x week  Duration in weeks: 8  Treatment plan discussed with: patient        Subjective Evaluation    History of Present Illness  Mechanism of injury: Patient reports off/on radiucular sx into L LE off/on over the years. Patient's last episode of pain was related to a painting, and her pain resolved. Patient has had this pain for 6 months, no resolving.     Patient's biggest pain is when rolling over in bed at night. Patient sleeps on her L side.     PMH: osteopenia   Patient Goals  Patient goals for therapy: decreased pain, increased strength, independence with ADLs/IADLs and increased motion    Pain  Current pain ratin  At best pain ratin  At worst pain ratin  Location: B lumbar spine  Quality: radiating and sharp  Relieving factors: rest  Aggravating factors: sitting, standing, walking, stair climbing and lifting  Progression: no change    Social Support  Steps to enter house: yes  Stairs in house: yes   Lives in: multiple-level home  Lives with: spouse      Diagnostic Tests  No diagnostic tests performed  Treatments  No previous or current treatments      Objective     Concurrent Complaints  Positive for disturbed sleep.     Postural Observations  Seated posture: fair  Standing posture: fair  Correction of posture: makes symptoms better      Neurological Testing     Sensation     Lumbar   Left   Intact: light touch  Paresthesia: light touch    Reflexes   Left   Clonus sign: negative    Right   Clonus sign: negative    Additional Neurological Details  Patient currently with acute back pain, no leg pain at this time.         Active Range of Motion     Lumbar   Flexion: 60 (slight L hip pain) degrees  with pain  Extension: 15 degrees  with pain  Left lateral flexion:  WFL and with pain  Right lateral flexion:  WFL  Left rotation:  WFL  Right rotation:  WFL  Mechanical Assessment    Cervical      Thoracic      Lumbar    Standing flexion: repeated movements   Pain  location:peripheralized  Sitting flexion: repeated movements  Pain location: peripheralized  Standing extension: repeated movements  Pain location: no change  Lying extension: sustained positions  Pain intensity: better  Pain level: decreased    Strength/Myotome Testing     Lumbar   Left   Normal strength    Right   Normal strength    Tests     Lumbar   Positive SIJ compression and sacroiliac distraction .     Left   Negative slump test.     Right   Negative slump test.     Left Hip   Positive FEMI.   Negative long sit.     Right Hip   Positive FEMI.   Negative long sit.     Additional Tests Details  ASIS aligned.   ++TTP at B SI joint           Precautions: Alee treat      Manuals 5/1                                       Neuro Re-Ed                PPT        bridges        Sup DLS alt hip flexion        TA ball press        TB multifidus                 Ther Ex                                GLADYS        Prone press ups                SLR flex, sup        S/l hip abd        Clam shells? With TB                Step ups FW, lateral        Step downs                                                Ther Activity                        Gait Training                        Modalities        MH vs CP PRN

## 2024-05-08 ENCOUNTER — ESTABLISHED COMPREHENSIVE EXAM (OUTPATIENT)
Dept: URBAN - METROPOLITAN AREA CLINIC 6 | Facility: CLINIC | Age: 69
End: 2024-05-08

## 2024-05-08 DIAGNOSIS — R73.09: ICD-10-CM

## 2024-05-08 DIAGNOSIS — H43.813: ICD-10-CM

## 2024-05-08 DIAGNOSIS — H40.1132: ICD-10-CM

## 2024-05-08 DIAGNOSIS — H25.13: ICD-10-CM

## 2024-05-08 DIAGNOSIS — D23.112: ICD-10-CM

## 2024-05-08 PROBLEM — Z00.00 MEDICARE ANNUAL WELLNESS VISIT, SUBSEQUENT: Status: RESOLVED | Noted: 2023-03-08 | Resolved: 2024-05-08

## 2024-05-08 LAB
LEFT EYE DIABETIC RETINOPATHY: NORMAL
RIGHT EYE DIABETIC RETINOPATHY: NORMAL

## 2024-05-08 PROCEDURE — 99214 OFFICE O/P EST MOD 30 MIN: CPT

## 2024-05-08 PROCEDURE — 92134 CPTRZ OPH DX IMG PST SGM RTA: CPT | Mod: NC

## 2024-05-08 PROCEDURE — 92133 CPTRZD OPH DX IMG PST SGM ON: CPT

## 2024-05-08 ASSESSMENT — VISUAL ACUITY
OD_CC: 20/20
OS_CC: 20/20

## 2024-05-08 ASSESSMENT — TONOMETRY
OS_IOP_MMHG: 22
OD_IOP_MMHG: 18
OD_IOP_MMHG: 19
OS_IOP_MMHG: 24

## 2024-05-09 ENCOUNTER — OFFICE VISIT (OUTPATIENT)
Dept: PHYSICAL THERAPY | Age: 69
End: 2024-05-09
Payer: MEDICARE

## 2024-05-09 DIAGNOSIS — M54.30 SCIATIC LEG PAIN: Primary | ICD-10-CM

## 2024-05-09 PROCEDURE — 97110 THERAPEUTIC EXERCISES: CPT | Performed by: PHYSICAL THERAPIST

## 2024-05-09 NOTE — PROGRESS NOTES
Daily Note     Today's date: 2024  Patient name: Haris Chirinos  : 1955  MRN: [id number]  Referring provider: Maximo Glez DO  Dx:   Encounter Diagnosis     ICD-10-CM    1. Sciatic leg pain  M54.30                      Subjective: Patient reports that she has been consistent with her HEP and still feels back pain. Patient still having pain in the AM when waking up, eases as the day goes on.   Patient would like to resume some of her fitness program, curious what she can do even when she's in pain.    Objective: See treatment diary below      Assessment: Tolerated treatment well. PT continuing to assess patient's pain. PT initially concerned for SI dysfunction vs lumbar HNP; however, today, after exercises and new stretches, discussing patient's fitness program; patient presents with Q/L tightness at L side. Updated HEP for home, recommended new exercises for patient to trial in addition to MH at night/during the day for pain. Patient is ok to continue her previous program and resume her TM walking, elliptical and rowing machine.       Plan: Continue per plan of care.  Plan to progress NV to hip and core strengthening to progress stability of the pelvis.      Precautions: Alee treat      Manuals                                        Neuro Re-Ed                PPT Has been doing at home       bridges 10- still slightly painful               TA ball press        TB multifidus  NV               Ther Ex                                GLADYS 2x:30       Prone press ups 10x - still painful       DKTC with strap 10x:10       Piriformis stretch 5x:20 ea       Standing Q/L stretch with foam roll 3x:05 pain starts at 10 sec                        SLR flex, sup NV       S/l hip abd NV       Clam shells? With TB NV               Step ups FW, lateral NV       Step downs NV                                               Ther Activity                        Gait Training                         Modalities        MH vs CP PRN

## 2024-05-16 ENCOUNTER — OFFICE VISIT (OUTPATIENT)
Dept: PHYSICAL THERAPY | Age: 69
End: 2024-05-16
Payer: MEDICARE

## 2024-05-16 DIAGNOSIS — M54.30 SCIATIC LEG PAIN: Primary | ICD-10-CM

## 2024-05-16 PROCEDURE — 97112 NEUROMUSCULAR REEDUCATION: CPT | Performed by: PHYSICAL THERAPIST

## 2024-05-16 PROCEDURE — 97110 THERAPEUTIC EXERCISES: CPT | Performed by: PHYSICAL THERAPIST

## 2024-05-16 NOTE — PROGRESS NOTES
Daily Note     Today's date: 2024  Patient name: Haris Chirinos  : 1955  MRN: [id number]  Referring provider: Maximo Glez DO  Dx:   Encounter Diagnosis     ICD-10-CM    1. Sciatic leg pain  M54.30                      Subjective: Patient reports that she has been doing her stretches at home, feels the stretching helps with her pain, and overall her pain is better. Patient did have 1 episode of radicular sx with rolling in bed.       Objective: See treatment diary below      Assessment: Tolerated treatment well. PT progressing program, focusing on progressing core strength and stabilization. Patient tolerating program well, progressed HEP for home. Recommended patient to continue with her current plan.       Plan: Continue per plan of care.      Precautions: Alee treat      Manuals                                       Neuro Re-Ed                PPT Has been doing at home 10x:10      bridges 10- still slightly painful 2x15 not painful today              TA ball press  10x:03      TB multifidus  NV YTB 5x ea, gave red and GTB for home as well.              Ther Ex                                GLADYS 2x:30 HELD      Prone press ups 10x - still painful HELD      DKTC with strap 10x:10 Alleviates pain      Piriformis stretch 5x:20 ea No change      Standing Q/L stretch with foam roll 3x:05 pain starts at 10 sec  Alleviates pain, has been using at home                      SLR flex, sup NV Attempted with yellow TB, too hard, 1.5# 2x10 ea      S/l hip abd NV Not yet      Clam shells? With TB NV No TB, focusing on form 15x ea              Step ups FW, lateral NV 10x ea      Step downs NV Not yet        Hip hiking 5x ea                                      Ther Activity                        Gait Training                        Modalities        MH vs CP PRN  X10' seated

## 2024-05-22 ENCOUNTER — OFFICE VISIT (OUTPATIENT)
Dept: CARDIOLOGY CLINIC | Facility: CLINIC | Age: 69
End: 2024-05-22
Payer: MEDICARE

## 2024-05-22 VITALS
WEIGHT: 142 LBS | OXYGEN SATURATION: 98 % | DIASTOLIC BLOOD PRESSURE: 70 MMHG | BODY MASS INDEX: 25.16 KG/M2 | HEIGHT: 63 IN | SYSTOLIC BLOOD PRESSURE: 130 MMHG | HEART RATE: 87 BPM

## 2024-05-22 DIAGNOSIS — R42 DIZZINESS: ICD-10-CM

## 2024-05-22 DIAGNOSIS — I10 HYPERTENSION, UNSPECIFIED TYPE: ICD-10-CM

## 2024-05-22 DIAGNOSIS — I10 PRIMARY HYPERTENSION: Primary | ICD-10-CM

## 2024-05-22 PROCEDURE — 99213 OFFICE O/P EST LOW 20 MIN: CPT | Performed by: INTERNAL MEDICINE

## 2024-05-22 NOTE — PROGRESS NOTES
Cardiology Follow Up    Haris Chirinos  1955  [id number]  [facility] [place]  [address]  [place] [address] [phone]    No diagnosis found.    There are no diagnoses linked to this encounter.  I had the pleasure of seeing Haris Chirinos for a follow up visit.     INTERVAL HISTORY: none    History of the presenting illness, Discussion/Summary and My Plan are as follows:::     is a pleasant 68-year-old lady with questionable hypertension-has whitecoat syndrome, no prior diagnosis of diabetes although she has a single-most recent A1c at 6.6-2023, dyslipidemia-at least for the last 10 to 20 years has been on a statin-previously simvastatin-no side effects, subsequently switched to rosuvastatin on which she is at this time.  She also has hypothyroidism-is on Synthroid, her Raynaud's which in general is not much bothersome as well as glaucoma.     She is a retired , remains active, going to the gym for 45 minutes to an hour almost daily-for example on the treadmill at a 3.5 mph pace using an incline of 45 without any limitations or symptoms. She vacationed in [place] in 2023, getting up to 20,000 steps, on some days up to 34 floors without any limitations except for arthritis symptoms.    She mainly sought attention for her candidacy for antihypertensive medications, after being off all medications, blood pressures were elevated and started on losartan 12.5 mg daily, she brought her home log here and blood pressures are almost consistently well-controlled, sometimes elevated in the evenings but her most recent evening blood pressure was 105 systolic.  When she stands up, sometimes feels lightheaded-happens about 1-2 times a day.  No presyncope or syncope. No cardiac symptoms despite high physical activity level     Family history: Father  of gastric cancer at 76,  mother  at 91, was a diabetic, her sister older by 9 years has mitral prolapse, MRN needed and mitral repair     Medication history-  In the past she had tried losartan 25 mg daily but noticed that she felt dizzy after exercise with systolic blood pressure as low as 99 on occasion.  This has not occurred recently however.  She has tried metoprolol-probably at a dose of 25 mg daily and bradycardia to the 40s and felt symptomatic, no longer on it.  She was prescribed timolol eyedrops for glaucoma and she felt this was consistently causing bradycardia.     Her most recent testing in [place] included an echocardiogram in  which was essentially unremarkable except for borderline mitral prolapse with mild MR, no LVH     Stress test 2022-8.2 METS, terminated early due to hypertensive response, baseline 168/88-peak 204/70, no ischemia     Cardiac exam was unremarkable      Plan:    Hypertension: Mild, currently on losartan 12.5 mg daily and well-controlled based on her home blood pressure log.  On losartan 25 mg, felt more dizzy, on metoprolol in the past felt bradycardic limiting its use, even timolol eyedrops in the past had  She does have Raynaud's but this is not particularly bothersome, uses hand warmers, amlodipine is an option in this setting.    Occasional lightheadedness with orthostasis: Negative orthostatics in the office today.  Also feels lightheaded when she comes home after working out on the elliptical at the gym.     Last A1c of 6.6: If it remains persistently high, she would be in the diabetic range but would defer this to the primary care physician     Dyslipidemia: Not sure what her prior premedication lipid levels were but now well controlled.  She did have a calcium score of 1 in 2021, most recent lipid profile was acceptable, no changes at this time     Mild mitral valve prolapse: No significant murmurs on exam, remains asymptomatic, hold off on echocardiogram at this time,  could consider it in 2024     Considering that she is completely asymptomatic at good levels of physical exertion, no need for stress testing at this time      Follow-up in 12 months     Latest Reference Range & Units 07/31/23 08:02   Cholesterol See Comment mg/dL 159   Triglycerides See Comment mg/dL 147   HDL >=50 mg/dL 58   Non-HDL Cholesterol mg/dl 101   LDL Calculated 0 - 100 mg/dL 72         Patient Active Problem List   Diagnosis    Primary hypertension    Other specified hypothyroidism    Mitral valve prolapse    Mixed hyperlipidemia    Prediabetes    Osteopenia of neck of femur    Rash    Vitamin D deficiency    Glaucoma of both eyes    Acute pain of right shoulder    Type 2 diabetes mellitus without complication, without long-term current use of insulin (HCC)    Female stress incontinence    Skin lesion of face    Sciatica of left side     Past Medical History:   Diagnosis Date    Glaucoma     Hypertension     Hypothyroid     Mitral valve prolapse     Osteopenia     Vitamin D deficiency      Social History     Socioeconomic History    Marital status: /Civil Union     Spouse name: Not on file    Number of children: Not on file    Years of education: Not on file    Highest education level: Not on file   Occupational History    Not on file   Tobacco Use    Smoking status: Never    Smokeless tobacco: Never   Vaping Use    Vaping status: Never Used   Substance and Sexual Activity    Alcohol use: Yes     Comment: occ    Drug use: Never    Sexual activity: Not Currently   Other Topics Concern    Not on file   Social History Narrative    Not on file     Social Determinants of Health     Financial Resource Strain: Not on file   Food Insecurity: No Food Insecurity (4/8/2024)    Hunger Vital Sign     Worried About Running Out of Food in the Last Year: Never true     Ran Out of Food in the Last Year: Never true   Transportation Needs: No Transportation Needs (4/8/2024)    PRAPARE - Transportation     Lack of  Transportation (Medical): No     Lack of Transportation (Non-Medical): No   Physical Activity: Not on file   Stress: Not on file   Social Connections: Not on file   Intimate Partner Violence: Not on file   Housing Stability: Low Risk  (4/7/2024)    Housing Stability Vital Sign     Unable to Pay for Housing in the Last Year: No     Number of Places Lived in the Last Year: 1     Unstable Housing in the Last Year: No      Family History   Problem Relation Age of Onset    Diabetes Mother     Stomach cancer Father 74    No Known Problems Sister     No Known Problems Daughter     No Known Problems Maternal Grandmother     No Known Problems Maternal Grandfather     No Known Problems Paternal Grandmother     No Known Problems Paternal Grandfather     Prostate cancer Brother 68    No Known Problems Son     No Known Problems Paternal Aunt     Kidney cancer Maternal Uncle 61     No past surgical history on file.    Current Outpatient Medications:     aspirin 81 mg chewable tablet, Chew 81 mg every other day, Disp: , Rfl:     brimonidine tartrate 0.2 % ophthalmic solution, , Disp: , Rfl:     Calcium-Magnesium-Vitamin D (CITRACAL CALCIUM+D PO), Take 650 mg by mouth in the morning, Disp: , Rfl:     cholecalciferol (VITAMIN D3) 25 mcg (1,000 units) tablet, Take 3,000 mcg by mouth daily, Disp: , Rfl:     Coenzyme Q10 (Co Q10) 100 MG CAPS, Take by mouth, Disp: , Rfl:     latanoprost (XALATAN) 0.005 % ophthalmic solution, Administer 1 drop to both eyes daily at bedtime, Disp: , Rfl:     Levoxyl 88 MCG tablet, TAKE 1 TABLET BY MOUTH EVERY DAY, Disp: 90 tablet, Rfl: 1    losartan (COZAAR) 25 mg tablet, Take 0.5 tablets (12.5 mg total) by mouth daily, Disp: 45 tablet, Rfl: 3    rosuvastatin (CRESTOR) 10 MG tablet, TAKE 1 TABLET BY MOUTH EVERY DAY, Disp: 90 tablet, Rfl: 1  No Known Allergies    Imaging: No results found.    Review of Systems:  Review of Systems   Constitutional: Negative.    HENT: Negative.     Eyes: Negative.   "  Respiratory: Negative.     Cardiovascular: Negative.    Endocrine: Negative.    Neurological:  Positive for dizziness.       Physical Exam:  /72 (BP Location: Left arm, Patient Position: Sitting, Cuff Size: Standard)   Pulse 87   Ht 5' 3\" (1.6 m)   Wt 64.4 kg (142 lb)   SpO2 98%   BMI 25.15 kg/m²   Physical Exam  Constitutional:       General: She is not in acute distress.     Appearance: She is not ill-appearing, toxic-appearing or diaphoretic.   HENT:      Mouth/Throat:      Mouth: Mucous membranes are moist.      Pharynx: Oropharynx is clear. No oropharyngeal exudate or posterior oropharyngeal erythema.   Cardiovascular:      Rate and Rhythm: Normal rate and regular rhythm.      Heart sounds: No murmur heard.     No friction rub. No gallop.   Pulmonary:      Effort: Pulmonary effort is normal. No respiratory distress.      Breath sounds: No stridor. No wheezing or rhonchi.   Abdominal:      General: Abdomen is flat. There is no distension.      Palpations: There is no mass.      Tenderness: There is no abdominal tenderness.      Hernia: No hernia is present.   Musculoskeletal:         General: No swelling, tenderness, deformity or signs of injury. Normal range of motion.   Skin:     General: Skin is warm.      Coloration: Skin is not jaundiced or pale.      Findings: No bruising or erythema.   Neurological:      Mental Status: She is alert.         This note was completed in part utilizing [employer] direct voice recognition software.   Grammatical errors, random word insertion, spelling mistakes, occasional wrong word or \"sound-alike\" substitutions and incomplete sentences may be an occasional consequence of the system secondary to software limitations, ambient noise and hardware issues. At the time of dictation, efforts were made to edit, clarify and /or correct errors.  Please read the chart carefully and recognize, using context, where substitutions have occurred.  If you have any questions " or concerns about the context, text or information contained within the body of this dictation, please contact myself, the provider, for further clarification.

## 2024-05-23 ENCOUNTER — OFFICE VISIT (OUTPATIENT)
Dept: PHYSICAL THERAPY | Age: 69
End: 2024-05-23
Payer: MEDICARE

## 2024-05-23 DIAGNOSIS — M54.30 SCIATIC LEG PAIN: Primary | ICD-10-CM

## 2024-05-23 PROCEDURE — 97110 THERAPEUTIC EXERCISES: CPT | Performed by: PHYSICAL THERAPIST

## 2024-05-23 PROCEDURE — 97112 NEUROMUSCULAR REEDUCATION: CPT | Performed by: PHYSICAL THERAPIST

## 2024-05-23 NOTE — PROGRESS NOTES
Daily Note     Today's date: 2024  Patient name: Haris Chirinos  : 1955  MRN: [id number]  Referring provider: Maximo Glez DO  Dx:   Encounter Diagnosis     ICD-10-CM    1. Sciatic leg pain  M54.30                      Subjective: Patient reports that she is still having pain; did have a Shingles shot       Objective: See treatment diary below      Assessment: Tolerated treatment well. PT progressed program, discussed pain, offered lumbar mechanical traction. Progressed to new stretch for supine, with the goal of alleviating hip pain.   PT continuing with exercises for the hip, core and nerve gliding/stretching; patient's pain appears to be more nerve related than muscle at this current time.     Plan: Continue per plan of care.  Recommending patient to continue with PT and if her pain continues after ~ 4 weeks of PT- then will recommend patient back to MD with possible referral to spine and pain.      Precautions: Alee treat      Manuals                                 Neuro Re-Ed              PPT Has been doing at home 10x:10 10x:10    bridges 10- still slightly painful 2x15 not painful today 15x           TA ball press  10x:03 10x:05    TB multifidus  NV YTB 5x ea, gave red and GTB for home as well. -           Ther Ex                            GLADYS 2x:30 HELD -    Prone press ups 10x - still painful HELD -    DKTC with strap 10x:10 Alleviates pain -    Piriformis stretch 5x:20 ea No change -    Standing Q/L stretch with foam roll 3x:05 pain starts at 10 sec  Alleviates pain, has been using at home Supine Q/L stretch- ea 3x:30                  SLR flex, sup NV Attempted with yellow TB, too hard, 1.5# 2x10 ea 1.5# 2x10 ea    S/l hip abd NV Not yet Supine hip abd with GTB 2x10    Clam shells? With TB NV No TB, focusing on form 15x ea        Clam shell progression - foot up (hip IR) 5x ea    Step ups FW, lateral NV 10x ea -    Step downs NV Not yet -      Hip hiking 5x ea  -       Leg press 50# 10x - L hip sore                          Ther Activity                     Gait Training                     Modalities       MH vs CP PRN  X10' seated X5' L hip s/l prior, 5 min lumbar spine and L hip seated after

## 2024-05-30 ENCOUNTER — OFFICE VISIT (OUTPATIENT)
Dept: PHYSICAL THERAPY | Age: 69
End: 2024-05-30
Payer: MEDICARE

## 2024-05-30 DIAGNOSIS — M54.30 SCIATIC LEG PAIN: Primary | ICD-10-CM

## 2024-05-30 PROCEDURE — 97112 NEUROMUSCULAR REEDUCATION: CPT | Performed by: PHYSICAL THERAPIST

## 2024-05-30 PROCEDURE — 97110 THERAPEUTIC EXERCISES: CPT | Performed by: PHYSICAL THERAPIST

## 2024-05-30 NOTE — PROGRESS NOTES
Daily Note     Today's date: 2024  Patient name: Haris Chirinos  : 1955  MRN: [id number]  Referring provider: Maximo Glez DO  Dx:   Encounter Diagnosis     ICD-10-CM    1. Sciatic leg pain  M54.30                      Subjective: Patient reports that her hip and back felt better after last session.       Objective: See treatment diary below      Assessment: Tolerated treatment well. Patient can feel the L hip is sore still with the leg press- possible technique vs too much weight- plan to assess NV. Patient is able to tolerate progressions in reps and additional strengthening equipment today. Discussed the goal of progressing strength to alleviate patient's hip and back pain, and if patient's pain persists patient can return to the doctor for additional assessment, imaging, etc. Patient has been tolerating use of the elliptical at home, and is continuing with her current activities.     Plan: Continue per plan of care.      Precautions: Alee treat      Manuals                                Neuro Re-Ed              PPT Has been doing at home 10x:10 10x:10 10x:10   bridges 10- still slightly painful 2x15 not painful today 15x 15x          TA ball press  10x:03 10x:05 10x:05   TB multifidus  NV YTB 5x ea, gave red and GTB for home as well. - YTB 10x ea          Ther Ex                            GLADYS 2x:30 HELD -    Prone press ups 10x - still painful HELD -    DKTC with strap 10x:10 Alleviates pain -    Piriformis stretch 5x:20 ea No change -    Standing Q/L stretch with foam roll 3x:05 pain starts at 10 sec  Alleviates pain, has been using at home Supine Q/L stretch- ea 3x:30 3x:30                 SLR flex, sup NV Attempted with yellow TB, too hard, 1.5# 2x10 ea 1.5# 2x10 ea 1.5# 2x10 ea   S/l hip abd NV Not yet Supine hip abd with GTB 2x10 GTB 2x15   Clam shells? With TB NV No TB, focusing on form 15x ea        Clam shell progression - foot up (hip IR) 5x ea 2x5    Step ups FW, lateral NV 10x ea -    Step downs NV Not yet -      Hip hiking 5x ea - NT      Leg press 50# 10x - L hip sore  Leg press 50# L hip still sore - watch technique NV  15x        Hip abd - 25# 2x15                 Ther Activity                     Gait Training                     Modalities       MH vs CP PRN  X10' seated X5' L hip s/l prior, 5 min lumbar spine and L hip seated after X10'

## 2024-06-06 ENCOUNTER — APPOINTMENT (OUTPATIENT)
Dept: PHYSICAL THERAPY | Age: 69
End: 2024-06-06
Payer: MEDICARE

## 2024-06-13 ENCOUNTER — OFFICE VISIT (OUTPATIENT)
Dept: PHYSICAL THERAPY | Age: 69
End: 2024-06-13
Payer: MEDICARE

## 2024-06-13 DIAGNOSIS — M54.30 SCIATIC LEG PAIN: Primary | ICD-10-CM

## 2024-06-13 PROCEDURE — 97112 NEUROMUSCULAR REEDUCATION: CPT | Performed by: PHYSICAL THERAPIST

## 2024-06-13 PROCEDURE — 97110 THERAPEUTIC EXERCISES: CPT | Performed by: PHYSICAL THERAPIST

## 2024-06-13 NOTE — PROGRESS NOTES
Daily Note     Today's date: 2024  Patient name: Haris Chirinos  : 1955  MRN: [id number]  Referring provider: Maximo Glez DO  Dx:   Encounter Diagnosis     ICD-10-CM    1. Sciatic leg pain  M54.30                      Subjective: Patient reports that she's been doing well at home, still does have the same pain in her back when she moves in certain ways. Patient reports no pain after therapy last session.       Objective: See treatment diary below      Assessment: Tolerated treatment well. Patient tolerating full program well, adding supine clamshells with TB for HEP online as well. Patient still presents with L hip OA and possible arthritis in the spine causing a pinched nerve in the spine contributing to her hip and leg sx.       Plan: Continue per plan of care.      Precautions: Alee treat      Manuals                                Neuro Re-Ed              PPT 10x:10 10x:10 10x:10 10x:10   bridges 15x 2x15 not painful today 15x 15x          TA ball press 10x:05 10x:03 10x:05 10x:05   TB multifidus  YTB 10x ea YTB 5x ea, gave red and GTB for home as well. - YTB 10x ea          Ther Ex              Standing Q/L stretch with foam roll 3x:30 Alleviates pain, has been using at home Supine Q/L stretch- ea 3x:30 3x:30                 SLR flex, sup 1.5# 2x10 ea Attempted with yellow TB, too hard, 1.5# 2x10 ea 1.5# 2x10 ea 1.5# 2x10 ea   Supine hip abd GTB 20x  Not yet Supine hip abd with GTB 2x10 GTB 2x15   Clam shells, AROM 20x  No TB, focusing on form 15x ea     Clam shell progression 2x5  Clam shell progression - foot up (hip IR) 5x ea 2x5   Step ups FW, lateral  10x ea -    Step downs  Not yet -      Hip hiking 5x ea - NT   Leg press 50# 10x Seat#5  Leg press 50# 10x - L hip sore  Leg press 50# L hip still sore - watch technique NV  15x    Hip abd  20# 2x15    Hip abd - 25# 2x15   Ham curls 25# 2x10             Ther Activity                     Gait Training                      Modalities       MH vs CP PRN X15 min seated X10' seated X5' L hip s/l prior, 5 min lumbar spine and L hip seated after X10'

## 2024-06-20 ENCOUNTER — OFFICE VISIT (OUTPATIENT)
Dept: PHYSICAL THERAPY | Age: 69
End: 2024-06-20
Payer: MEDICARE

## 2024-06-20 DIAGNOSIS — M54.30 SCIATIC LEG PAIN: Primary | ICD-10-CM

## 2024-06-20 PROCEDURE — 97110 THERAPEUTIC EXERCISES: CPT | Performed by: PHYSICAL THERAPIST

## 2024-06-20 PROCEDURE — 97112 NEUROMUSCULAR REEDUCATION: CPT | Performed by: PHYSICAL THERAPIST

## 2024-06-20 NOTE — PROGRESS NOTES
Daily Note     Today's date: 2024  Patient name: Haris Chirinos  : 1955  MRN: [id number]  Referring provider: Maximo Glez DO  Dx:   Encounter Diagnosis     ICD-10-CM    1. Sciatic leg pain  M54.30                      Subjective: ***      Objective: See treatment diary below      Assessment: Tolerated treatment {Tolerated treatment :9334332984}. Patient {assessment:[id number]}      Plan: {PLAN:2051127542}     Precautions: Alee treat      Manuals                                Neuro Re-Ed              PPT 10x:10 10x:10 10x:10 10x:10   bridges 15x 2x15 15x 15x          TA ball press 10x:05 10x:05 10x:05 10x:05   TB multifidus  YTB 10x ea YTB 10x ea - YTB 10x ea          Ther Ex              Supine Q/L stretch 3x:30 2x:30 Supine Q/L stretch- ea 3x:30 3x:30                 SLR flex, sup 1.5# 2x10 ea 1.5# 2x10 ea 1.5# 2x10 ea 1.5# 2x10 ea   Supine hip abd GTB 20x  GTB 20x Supine hip abd with GTB 2x10 GTB 2x15   Clam shells, AROM 20x  With clam shell progressions today     Clam shell progression 2x5 clam shell first,  Clam shell progression 2nd 5 reps 2x5 ea- clam shells first, then progression Clam shell progression - foot up (hip IR) 5x ea 2x5   Step ups FW, lateral   -    Step downs   -       - NT   Leg press 50# 10x Seat#5 50# 10x Leg press 50# 10x - L hip sore  Leg press 50# L hip still sore - watch technique NV  15x    Hip abd  20# 2x15  20# 2x15  Hip abd - 25# 2x15   Ham curls 25# 2x10 35# 2x10            Ther Activity                     Gait Training                     Modalities       MH vs CP PRN X15 min seated X10' seated X5' L hip s/l prior, 5 min lumbar spine and L hip seated after X10'

## 2024-06-21 NOTE — PROGRESS NOTES
PT Evaluation     Today's date: 2024  Patient name: Haris Chirinos  : 1955  MRN: [id number]  Referring provider: Maximo Glez DO  Dx:   Encounter Diagnosis     ICD-10-CM    1. Sciatic leg pain  M54.30                      Assessment  Impairments: abnormal gait, abnormal or restricted ROM, activity intolerance, lacks appropriate home exercise program, pain with function, poor posture  and poor body mechanics  Functional limitations: Back pain with bending, lifting, with current fitness program, pain with IADLs and recreational activities    Assessment details: Patient seen for PT re-assessment. Patient ready for discharge to [facility]. PT in agreement. Patient has been exercising at home with her HEP and has slowly resumed her fitness/cardio equipment TM walking/elliptical. Patient is independent with her exercises in PT, has improved and good core stabilization.   PT recommended patient to follow up with PCP and/or possible referral to pain management in the future if her pain continues to is not alleviated/maintained with HEP.   Understanding of Dx/Px/POC: good     Prognosis: good    Goals  STG to be met in 4 weeks  - Patient to be able to tolerate HEP without increase in L LE pain met  - Reduce L LE radicular sx 50% met    LTG to be met in 8 weeks  - Improve core stabilization and LE flexibility to reduce back and LE pain with patient's return to fitness program. met  - Patient to be able to tolerate IADL type activity without increase in back pain. met    Plan  Patient would benefit from: skilled physical therapy  Planned modality interventions: cryotherapy and thermotherapy: hydrocollator packs    Planned therapy interventions: abdominal trunk stabilization, joint mobilization, IASTM, kinesiology taping, manual therapy, neuromuscular re-education, patient education, postural training, strengthening, stretching, therapeutic activities, therapeutic exercise, home exercise program, gait  training, balance and body mechanics training    Frequency: 2x week  Duration in weeks: 8  Treatment plan discussed with: patient        Subjective Evaluation    History of Present Illness  Mechanism of injury: Patient reports a reduction in back pain, however, does still have moments of increased/significant back pain. Patient is having more moments of less pain during a weeks time.    Patient does feel the exercises have helped with some of her pain.    PMH: osteopenia   Patient Goals  Patient goals for therapy: decreased pain, increased strength, independence with ADLs/IADLs and increased motion    Pain  Current pain rating: 3  At best pain ratin  At worst pain rating: 3  Location: B lumbar spine  Quality: radiating and sharp  Relieving factors: rest  Aggravating factors: sitting, standing, walking, stair climbing and lifting  Progression: no change    Social Support  Steps to enter house: yes  Stairs in house: yes   Lives in: multiple-level home  Lives with: spouse      Diagnostic Tests  No diagnostic tests performed  Treatments  No previous or current treatments        Objective     Concurrent Complaints  Positive for disturbed sleep.     Postural Observations  Seated posture: fair  Standing posture: fair  Correction of posture: makes symptoms better      Neurological Testing     Sensation     Lumbar   Left   Intact: light touch  Paresthesia: light touch    Reflexes   Left   Clonus sign: negative    Right   Clonus sign: negative    Additional Neurological Details  Patient currently with acute back pain, no leg pain at this time.         Active Range of Motion     Lumbar   Flexion: 60 (slight L hip pain) degrees  with pain  Extension: 15 degrees  with pain  Left lateral flexion:  WFL and with pain  Right lateral flexion:  WFL  Left rotation:  WFL  Right rotation:  WFL  Mechanical Assessment    Cervical      Thoracic      Lumbar    Standing flexion: repeated movements   Pain location:peripheralized  Sitting  flexion: repeated movements  Pain location: peripheralized  Standing extension: repeated movements  Pain location: no change  Lying extension: sustained positions  Pain intensity: better  Pain level: decreased    Strength/Myotome Testing     Lumbar   Left   Normal strength    Right   Normal strength    Tests     Lumbar   Positive SIJ compression and sacroiliac distraction .     Left   Negative slump test.     Right   Negative slump test.     Left Hip   Positive FEMI.   Negative long sit.     Right Hip   Positive FEMI.   Negative long sit.     Additional Tests Details  ASIS aligned.   ++TTP at B SI joint             Precautions: Alee treat      Manuals 6/13 6/20 5/23 5/30                               Neuro Re-Ed              PPT 10x:10 10x:10 10x:10 10x:10   bridges 15x 2x15 15x 15x          TA ball press 10x:05 10x:05 10x:05 10x:05   TB multifidus  YTB 10x ea YTB 10x ea - YTB 10x ea          Ther Ex              Supine Q/L stretch 3x:30 2x:30 Supine Q/L stretch- ea 3x:30 3x:30                 SLR flex, sup 1.5# 2x10 ea 1.5# 2x10 ea 1.5# 2x10 ea 1.5# 2x10 ea   Supine hip abd GTB 20x  GTB 20x Supine hip abd with GTB 2x10 GTB 2x15   Clam shells, AROM 20x  With clam shell progressions today     Clam shell progression 2x5 clam shell first,  Clam shell progression 2nd 5 reps 2x5 ea- clam shells first, then progression Clam shell progression - foot up (hip IR) 5x ea 2x5   Step ups FW, lateral   -    Step downs   -       - NT   Leg press 50# 10x Seat#5 50# 10x Leg press 50# 10x - L hip sore  Leg press 50# L hip still sore - watch technique NV  15x    Hip abd  20# 2x15  20# 2x15  Hip abd - 25# 2x15   Ham curls 25# 2x10 35# 2x10            Ther Activity                     Gait Training                     Modalities       MH vs CP PRN X15 min seated X10' seated X5' L hip s/l prior, 5 min lumbar spine and L hip seated after X10'

## 2024-07-10 ENCOUNTER — IOP CHECK (OUTPATIENT)
Dept: URBAN - METROPOLITAN AREA CLINIC 6 | Facility: CLINIC | Age: 69
End: 2024-07-10

## 2024-07-10 DIAGNOSIS — H40.1132: ICD-10-CM

## 2024-07-10 PROCEDURE — 92012 INTRM OPH EXAM EST PATIENT: CPT

## 2024-07-10 ASSESSMENT — TONOMETRY
OS_IOP_MMHG: 16
OD_IOP_MMHG: 16
OD_IOP_MMHG: 15
OS_IOP_MMHG: 16

## 2024-07-10 ASSESSMENT — VISUAL ACUITY
OD_CC: 20/20
OS_CC: 20/20

## 2024-07-26 ENCOUNTER — TELEPHONE (OUTPATIENT)
Age: 69
End: 2024-07-26

## 2024-08-03 DIAGNOSIS — E03.8 OTHER SPECIFIED HYPOTHYROIDISM: ICD-10-CM

## 2024-08-03 DIAGNOSIS — E78.2 MIXED HYPERLIPIDEMIA: ICD-10-CM

## 2024-08-04 RX ORDER — LEVOTHYROXINE SODIUM 88 UG/1
88 TABLET ORAL DAILY
Qty: 100 TABLET | Refills: 1 | Status: SHIPPED | OUTPATIENT
Start: 2024-08-04

## 2024-08-04 RX ORDER — ROSUVASTATIN CALCIUM 10 MG/1
10 TABLET, COATED ORAL DAILY
Qty: 30 TABLET | Refills: 0 | Status: SHIPPED | OUTPATIENT
Start: 2024-08-04

## 2024-08-22 ENCOUNTER — HOSPITAL ENCOUNTER (EMERGENCY)
Facility: HOSPITAL | Age: 69
Discharge: HOME | End: 2024-08-22
Attending: EMERGENCY MEDICINE
Payer: MEDICARE

## 2024-08-22 VITALS
SYSTOLIC BLOOD PRESSURE: 162 MMHG | RESPIRATION RATE: 16 BRPM | TEMPERATURE: 97.8 F | OXYGEN SATURATION: 100 % | HEART RATE: 62 BPM | DIASTOLIC BLOOD PRESSURE: 94 MMHG

## 2024-08-22 DIAGNOSIS — R55 VASOVAGAL SYNCOPE: Primary | ICD-10-CM

## 2024-08-22 LAB
ATRIAL RATE: 52 BPM
ATRIAL RATE: 64 BPM
GLUCOSE SERPL-MCNC: 128 MG/DL (ref 65–140)
P AXIS: 68 DEGREES
P AXIS: 75 DEGREES
PR INTERVAL: 208 MS
PR INTERVAL: 218 MS
QRS AXIS: 62 DEGREES
QRS AXIS: 68 DEGREES
QRSD INTERVAL: 90 MS
QRSD INTERVAL: 92 MS
QT INTERVAL: 442 MS
QT INTERVAL: 460 MS
QTC INTERVAL: 427 MS
QTC INTERVAL: 455 MS
T WAVE AXIS: 80 DEGREES
T WAVE AXIS: 81 DEGREES
VENTRICULAR RATE: 52 BPM
VENTRICULAR RATE: 64 BPM

## 2024-08-22 PROCEDURE — 93010 ELECTROCARDIOGRAM REPORT: CPT | Performed by: INTERNAL MEDICINE

## 2024-08-22 PROCEDURE — 82948 REAGENT STRIP/BLOOD GLUCOSE: CPT

## 2024-08-22 PROCEDURE — 93005 ELECTROCARDIOGRAM TRACING: CPT

## 2024-08-22 PROCEDURE — 99285 EMERGENCY DEPT VISIT HI MDM: CPT

## 2024-08-22 PROCEDURE — 99284 EMERGENCY DEPT VISIT MOD MDM: CPT | Performed by: PHYSICIAN ASSISTANT

## 2024-08-22 NOTE — ED NOTES
Patient gives verbal consent to update daughter, per Dr. Morel's inquiry.     Cyndi Brian RN  08/22/24 0902

## 2024-08-22 NOTE — ED PROVIDER NOTES
History  Chief Complaint   Patient presents with    Syncope     Patient was with family member in GI, received troubling news, and patient reports feeling faint, diaphoretic. BP 73/45, heart rate in the 50s at surgical center. Patient currently reports feeling back to baseline  Patient reports skipping breakfast this AM     67 y/o female, h/o hypothyroidism/HTN/,MVP, etc. presented today for evaluation of a syncopal event that occurred while she was in outpatient while her  was being evaluated.  A medical emergency alert was performed. It was reported that patient and family received troubling news regarding 's health and patient became lightheaded and sweaty with subsequent, but brief, loss of consciousness while sitting in a chair and a BP of 78/50 which then improved.  Patient did not fall out of the chair.  Patient was in the room with multiple individuals/witnesses during that point in time.  Patient felt better after laying down and has resolved all symptoms and is now at baseline.  Patient states that she did not eat or drink this morning.  Taking her medication as prescribed.  Prior to syncopal event did not have any episodes of chest pain etc.  Has otherwise had a normal week without any recent illnesses.  Denies nausea, vomiting, chest pain, shortness of breath, calf pain or swelling headaches, changes in vision etc.  Differential includes but is not limited to dehydration, hypoglycemia, vasovagal event etc.        Prior to Admission Medications   Prescriptions Last Dose Informant Patient Reported? Taking?   Calcium-Magnesium-Vitamin D (CITRACAL CALCIUM+D PO)  Self Yes No   Sig: Take 650 mg by mouth in the morning   Coenzyme Q10 (Co Q10) 100 MG CAPS  Self Yes No   Sig: Take by mouth   aspirin 81 mg chewable tablet  Self Yes No   Sig: Chew 81 mg every other day   brimonidine tartrate 0.2 % ophthalmic solution  Self Yes No   cholecalciferol (VITAMIN D3) 25 mcg (1,000 units) tablet  Self Yes No    Sig: Take 3,000 mcg by mouth daily   latanoprost (XALATAN) 0.005 % ophthalmic solution  Self Yes No   Sig: Administer 1 drop to both eyes daily at bedtime   levothyroxine (Levoxyl) 88 mcg tablet   No No   Sig: TAKE 1 TABLET BY MOUTH EVERY DAY   losartan (COZAAR) 25 mg tablet  Self No No   Sig: Take 0.5 tablets (12.5 mg total) by mouth daily   rosuvastatin (CRESTOR) 10 MG tablet   No No   Sig: TAKE 1 TABLET BY MOUTH EVERY DAY      Facility-Administered Medications: None       Past Medical History:   Diagnosis Date    Glaucoma     Hypertension     Hypothyroid     Mitral valve prolapse     Osteopenia     Vitamin D deficiency        History reviewed. No pertinent surgical history.    Family History   Problem Relation Age of Onset    Diabetes Mother     Stomach cancer Father 74    No Known Problems Sister     No Known Problems Daughter     No Known Problems Maternal Grandmother     No Known Problems Maternal Grandfather     No Known Problems Paternal Grandmother     No Known Problems Paternal Grandfather     Prostate cancer Brother 68    No Known Problems Son     No Known Problems Paternal Aunt     Kidney cancer Maternal Uncle 61     I have reviewed and agree with the history as documented.    E-Cigarette/Vaping    E-Cigarette Use Never User      E-Cigarette/Vaping Substances    Nicotine No     THC No     CBD No     Flavoring No      Social History     Tobacco Use    Smoking status: Never    Smokeless tobacco: Never   Vaping Use    Vaping status: Never Used   Substance Use Topics    Alcohol use: Yes     Comment: occ    Drug use: Never       Review of Systems   Constitutional: Negative.  Negative for chills, fatigue and fever.   HENT: Negative.  Negative for congestion, postnasal drip, rhinorrhea and sore throat.    Eyes: Negative.    Respiratory: Negative.  Negative for cough, shortness of breath and wheezing.    Cardiovascular: Negative.    Gastrointestinal: Negative.  Negative for abdominal pain, diarrhea, nausea and  vomiting.   Endocrine: Negative.    Genitourinary: Negative.    Musculoskeletal: Negative.    Skin: Negative.    Neurological:  Positive for syncope and light-headedness. Negative for dizziness, tremors, seizures, facial asymmetry, speech difficulty, weakness, numbness and headaches.   Hematological: Negative.    Psychiatric/Behavioral: Negative.     All other systems reviewed and are negative.      Physical Exam  Physical Exam  Vitals and nursing note reviewed.   Constitutional:       General: She is not in acute distress.     Appearance: Normal appearance. She is well-developed and normal weight. She is not diaphoretic.   HENT:      Head: Normocephalic and atraumatic.      Right Ear: External ear normal.      Left Ear: External ear normal.      Nose: Nose normal.      Mouth/Throat:      Pharynx: No oropharyngeal exudate.   Eyes:      General: No scleral icterus.        Right eye: No discharge.         Left eye: No discharge.      Conjunctiva/sclera: Conjunctivae normal.      Pupils: Pupils are equal, round, and reactive to light.   Cardiovascular:      Rate and Rhythm: Regular rhythm. Bradycardia present.      Pulses: Normal pulses.      Heart sounds: Murmur heard.      No friction rub. No gallop.   Pulmonary:      Effort: Pulmonary effort is normal. No respiratory distress.      Breath sounds: Normal breath sounds. No stridor. No wheezing, rhonchi or rales.   Chest:      Chest wall: No tenderness.   Abdominal:      General: Bowel sounds are normal. There is no distension.      Palpations: Abdomen is soft. There is no mass.      Tenderness: There is no abdominal tenderness. There is no right CVA tenderness, left CVA tenderness, guarding or rebound.      Hernia: No hernia is present.   Musculoskeletal:         General: No swelling or tenderness.      Cervical back: Normal range of motion and neck supple.      Right lower leg: No edema.      Left lower leg: No edema.   Lymphadenopathy:      Cervical: No cervical  adenopathy.   Skin:     General: Skin is warm and dry.      Capillary Refill: Capillary refill takes less than 2 seconds.      Coloration: Skin is not jaundiced or pale.      Findings: No bruising, erythema, lesion or rash.   Neurological:      General: No focal deficit present.      Mental Status: She is alert and oriented to person, place, and time. Mental status is at baseline.   Psychiatric:         Thought Content: Thought content normal.         Judgment: Judgment normal.         Vital Signs  ED Triage Vitals [08/22/24 0849]   Temperature Pulse Respirations Blood Pressure SpO2   97.8 °F (36.6 °C) 74 16 154/70 99 %      Temp Source Heart Rate Source Patient Position - Orthostatic VS BP Location FiO2 (%)   Oral Monitor Lying Left arm --      Pain Score       No Pain           Vitals:    08/22/24 0849 08/22/24 0915   BP: 154/70 164/63   Pulse: 74 (!) 54   Patient Position - Orthostatic VS: Lying Lying         Visual Acuity  Visual Acuity      Flowsheet Row Most Recent Value   L Pupil Size (mm) 3   R Pupil Size (mm) 3            ED Medications  Medications - No data to display    Diagnostic Studies  Results Reviewed       Procedure Component Value Units Date/Time    Fingerstick Glucose (POCT) [[id number]]  (Normal) Collected: 08/22/24 0858    Lab Status: Final result Specimen: Blood Updated: 08/22/24 0859     POC Glucose 128 mg/dl                    No orders to display              Procedures  ECG 12 Lead Documentation Only    Date/Time: 8/22/2024 9:04 AM    Performed by: Sagrario Lara PA-C  Authorized by: Sagrario Lara PA-C    Indications / Diagnosis:  Syncope  ECG reviewed by me, the ED Provider: yes    Patient location:  ED  Interpretation:     Interpretation: normal    Rate:     ECG rate:  52    ECG rate assessment: bradycardic    Rhythm:     Rhythm: sinus bradycardia    Ectopy:     Ectopy: none    QRS:     QRS axis:  Normal    QRS intervals:  Normal  Conduction:     Conduction: normal    ST  segments:     ST segments:  Normal  T waves:     T waves: normal             ED Course                                 SBIRT 22yo+      Flowsheet Row Most Recent Value   Initial Alcohol Screen: US AUDIT-C     1. How often do you have a drink containing alcohol? 0 Filed at: 08/22/2024 0851   2. How many drinks containing alcohol do you have on a typical day you are drinking?  0 Filed at: 08/22/2024 0851   3b. FEMALE Any Age, or MALE 65+: How often do you have 4 or more drinks on one occassion? 0 Filed at: 08/22/2024 0851   Audit-C Score 0 Filed at: 08/22/2024 0851   BRENDA: How many times in the past year have you...    Used an illegal drug or used a prescription medication for non-medical reasons? Never Filed at: 08/22/2024 0851                      Medical Decision Making  Patient actively eating and drinking here in the emergency department feeling now at baseline.  EKG is normal sinus rhythm with glucose of 128 which is normal.  Patient's heart rate occasionally in the 50s which patient reports is her usual.  Patient and  are both finding a ride home at this point in time.  Patient deferred any further evaluation with blood work and feels comfortable being discharged. Discussed case and management with attending Dr. Madrigal. We observed patient in the ED for further symptoms. Patient remained asymptomatic.     Patient is informed to return to the emergency department for worsening of symptoms and was given proper education regarding their diagnosis and symptoms. Otherwise the patient is informed to follow up with their primary care doctor for re-evaluation. The patient and  verbalizes understanding and agrees with above assessment and plan. All questions were answered.                       Disposition  Final diagnoses:   Vasovagal syncope     Time reflects when diagnosis was documented in both MDM as applicable and the Disposition within this note       Time User Action Codes Description Comment     8/22/2024  9:34 AM Sagrario Lara Add [R55] Vasovagal syncope           ED Disposition       ED Disposition   Discharge    Condition   Stable    Date/Time   Thu Aug 22, 2024 0934    Comment   Haris Chirinos discharge to home/self care.                   Follow-up Information       Follow up With Specialties Details Why Contact Info Additional Information    [facility] Emergency Department Emergency Medicine Go to  If symptoms worsen, otherwise please follow up with your family doctor as needed [address] [facility] [facility] [id number] [phone] [facility] Emergency Department, [address], [place], [address]            Patient's Medications   Discharge Prescriptions    No medications on file       No discharge procedures on file.    PDMP Review       None            ED Provider  Electronically Signed by             Sagrario Lara PA-C  08/22/24 4822

## 2024-08-28 DIAGNOSIS — E78.2 MIXED HYPERLIPIDEMIA: ICD-10-CM

## 2024-08-29 RX ORDER — ROSUVASTATIN CALCIUM 10 MG/1
10 TABLET, COATED ORAL DAILY
Qty: 30 TABLET | Refills: 0 | Status: SHIPPED | OUTPATIENT
Start: 2024-08-29

## 2024-08-29 NOTE — TELEPHONE ENCOUNTER
Refill must be reviewed and completed by the office or provider. The refill is unable to be approved or denied by the medication management team.    Patient needs updated lipids  Courtesy refill previously given.

## 2024-09-25 DIAGNOSIS — I10 PRIMARY HYPERTENSION: ICD-10-CM

## 2024-09-25 RX ORDER — LOSARTAN POTASSIUM 25 MG/1
12.5 TABLET ORAL DAILY
Qty: 45 TABLET | Refills: 1 | Status: SHIPPED | OUTPATIENT
Start: 2024-09-25 | End: 2025-09-25

## 2024-10-07 ENCOUNTER — APPOINTMENT (OUTPATIENT)
Dept: LAB | Age: 69
End: 2024-10-07
Payer: MEDICARE

## 2024-10-07 DIAGNOSIS — E11.9 TYPE 2 DIABETES MELLITUS WITHOUT COMPLICATION, WITHOUT LONG-TERM CURRENT USE OF INSULIN (HCC): ICD-10-CM

## 2024-10-07 LAB
ALBUMIN SERPL BCG-MCNC: 4.3 G/DL (ref 3.5–5)
ALP SERPL-CCNC: 55 U/L (ref 34–104)
ALT SERPL W P-5'-P-CCNC: 14 U/L (ref 7–52)
ANION GAP SERPL CALCULATED.3IONS-SCNC: 6 MMOL/L (ref 4–13)
AST SERPL W P-5'-P-CCNC: 17 U/L (ref 13–39)
BILIRUB SERPL-MCNC: 0.55 MG/DL (ref 0.2–1)
BUN SERPL-MCNC: 12 MG/DL (ref 5–25)
CALCIUM SERPL-MCNC: 9.1 MG/DL (ref 8.4–10.2)
CHLORIDE SERPL-SCNC: 99 MMOL/L (ref 96–108)
CHOLEST SERPL-MCNC: 163 MG/DL
CO2 SERPL-SCNC: 31 MMOL/L (ref 21–32)
CREAT SERPL-MCNC: 0.83 MG/DL (ref 0.6–1.3)
CREAT UR-MCNC: 40.9 MG/DL
EST. AVERAGE GLUCOSE BLD GHB EST-MCNC: 131 MG/DL
GFR SERPL CREATININE-BSD FRML MDRD: 72 ML/MIN/1.73SQ M
GLUCOSE P FAST SERPL-MCNC: 119 MG/DL (ref 65–99)
HBA1C MFR BLD: 6.2 %
HDLC SERPL-MCNC: 56 MG/DL
LDLC SERPL CALC-MCNC: 83 MG/DL (ref 0–100)
MICROALBUMIN UR-MCNC: <7 MG/L
POTASSIUM SERPL-SCNC: 4.5 MMOL/L (ref 3.5–5.3)
PROT SERPL-MCNC: 7.1 G/DL (ref 6.4–8.4)
SODIUM SERPL-SCNC: 136 MMOL/L (ref 135–147)
TRIGL SERPL-MCNC: 121 MG/DL

## 2024-10-07 PROCEDURE — 82043 UR ALBUMIN QUANTITATIVE: CPT

## 2024-10-07 PROCEDURE — 83036 HEMOGLOBIN GLYCOSYLATED A1C: CPT

## 2024-10-07 PROCEDURE — 80061 LIPID PANEL: CPT

## 2024-10-07 PROCEDURE — 80053 COMPREHEN METABOLIC PANEL: CPT

## 2024-10-07 PROCEDURE — 36415 COLL VENOUS BLD VENIPUNCTURE: CPT

## 2024-10-07 PROCEDURE — 82570 ASSAY OF URINE CREATININE: CPT

## 2024-10-15 ENCOUNTER — OFFICE VISIT (OUTPATIENT)
Dept: INTERNAL MEDICINE CLINIC | Facility: CLINIC | Age: 69
End: 2024-10-15
Payer: MEDICARE

## 2024-10-15 ENCOUNTER — TELEPHONE (OUTPATIENT)
Dept: ADMINISTRATIVE | Facility: OTHER | Age: 69
End: 2024-10-15

## 2024-10-15 VITALS
HEIGHT: 63 IN | OXYGEN SATURATION: 97 % | RESPIRATION RATE: 20 BRPM | SYSTOLIC BLOOD PRESSURE: 160 MMHG | BODY MASS INDEX: 23.92 KG/M2 | WEIGHT: 135 LBS | HEART RATE: 85 BPM | DIASTOLIC BLOOD PRESSURE: 90 MMHG

## 2024-10-15 DIAGNOSIS — K21.9 GASTROESOPHAGEAL REFLUX DISEASE WITHOUT ESOPHAGITIS: ICD-10-CM

## 2024-10-15 DIAGNOSIS — Z12.11 SCREENING FOR COLON CANCER: ICD-10-CM

## 2024-10-15 DIAGNOSIS — L65.9 HAIR LOSS: ICD-10-CM

## 2024-10-15 DIAGNOSIS — F51.01 PRIMARY INSOMNIA: ICD-10-CM

## 2024-10-15 DIAGNOSIS — E03.9 HYPOTHYROIDISM, UNSPECIFIED TYPE: ICD-10-CM

## 2024-10-15 DIAGNOSIS — E11.9 TYPE 2 DIABETES MELLITUS WITHOUT COMPLICATION, WITHOUT LONG-TERM CURRENT USE OF INSULIN (HCC): ICD-10-CM

## 2024-10-15 DIAGNOSIS — E78.2 MIXED HYPERLIPIDEMIA: Primary | ICD-10-CM

## 2024-10-15 DIAGNOSIS — I10 PRIMARY HYPERTENSION: ICD-10-CM

## 2024-10-15 DIAGNOSIS — E55.9 VITAMIN D DEFICIENCY: ICD-10-CM

## 2024-10-15 DIAGNOSIS — Z13.0 SCREENING, ANEMIA, DEFICIENCY, IRON: ICD-10-CM

## 2024-10-15 PROBLEM — F41.9 ANXIETY: Status: ACTIVE | Noted: 2024-10-15

## 2024-10-15 PROCEDURE — G2211 COMPLEX E/M VISIT ADD ON: HCPCS | Performed by: INTERNAL MEDICINE

## 2024-10-15 PROCEDURE — 99214 OFFICE O/P EST MOD 30 MIN: CPT | Performed by: INTERNAL MEDICINE

## 2024-10-15 RX ORDER — FAMOTIDINE 20 MG/1
20 TABLET, FILM COATED ORAL DAILY PRN
Qty: 30 TABLET | Refills: 1 | Status: SHIPPED | OUTPATIENT
Start: 2024-10-15 | End: 2025-01-13

## 2024-10-15 RX ORDER — RAMELTEON 8 MG/1
8 TABLET ORAL
Qty: 30 TABLET | Refills: 1 | Status: SHIPPED | OUTPATIENT
Start: 2024-10-15

## 2024-10-15 NOTE — ASSESSMENT & PLAN NOTE
Lab Results   Component Value Date    HGBA1C 6.2 (H) 10/07/2024     Diabetes well-controlled A1c 6.2 we will continue the current medical regiment I will be ordering diabetic laboratories including comprehensive metabolic panel, hemoglobin A1c, urine microalbumin, lipid panel.  Eye examination completed, foot examination completed today

## 2024-10-15 NOTE — PROGRESS NOTES
Ambulatory Visit  Name: Haris Chirinos      : 1955      MRN: [id number]  Encounter Provider: Maximo Glez DO  Encounter Date: 10/15/2024   [facility]    Assessment & Plan  Type 2 diabetes mellitus without complication, without long-term current use of insulin ([facility])    Lab Results   Component Value Date    HGBA1C 6.2 (H) 10/07/2024     Diabetes well-controlled A1c 6.2 we will continue the current medical regiment I will be ordering diabetic laboratories including comprehensive metabolic panel, hemoglobin A1c, urine microalbumin, lipid panel.  Eye examination completed, foot examination completed today       Mixed hyperlipidemia  Hyperlipidemia controlled continue with current medical regiment recommend a low-cholesterol diet and recommend routine exercise we will continue to monitor the progress.  Continue Crestor 10 mg once daily       Primary hypertension  Patient's blood pressure is elevated in the office but when she checks at home it is in the normal range 1 10-1 20 in fact it had been lower into the low 100s and 90s for which the losartan was stopped likely secondary to the weight loss for now she will remain off losartan and we will continue to monitor home readings patient does have an element of whitecoat hypertension       Vitamin D deficiency  Continue current dose of vitamin D 3000 IUs once daily and check a vitamin D level  Orders:    Vitamin D 25 hydroxy; Future    Screening for colon cancer    Orders:    Cologuard    Primary insomnia  Anxiety/insomnia related to 's health caregiver stress Rx rozerem 8 mg 1 p.o. nightly as needed  Orders:    ramelteon (ROZEREM) 8 mg tablet; Take 1 tablet (8 mg total) by mouth daily at bedtime    Hypothyroidism, unspecified type  Hypothyroidism controlled the patient is currently euthyroid I will be ordering a TSH prior to the next office visit and the patient will continue with current medical  regiment; we will continue to monitor the patient's progress.  Orders:    TSH, 3rd generation; Future    Screening, anemia, deficiency, iron    Orders:    CBC (Includes Diff/Plt) (Refl); Future    Hair loss  Check TSH, CBC and iron panel  Orders:    TSH, 3rd generation; Future    CBC (Includes Diff/Plt) (Refl); Future    Iron Panel (Includes Ferritin, Iron Sat%, Iron, and TIBC); Future    Ambulatory Referral to Dermatology; Future    Gastroesophageal reflux disease without esophagitis  Mild symptoms Rx for Pepcid 20 mg once daily as needed see GI for EGD  Orders:    Ambulatory Referral to Gastroenterology; Future    famotidine (PEPCID) 20 mg tablet; Take 1 tablet (20 mg total) by mouth daily as needed for heartburn      Depression Screening and Follow-up Plan: Patient was screened for depression during today's encounter. They screened negative with a PHQ-2 score of 0.    RTO in 3 months call if any problems  History of Present Illness     HPI 68-year old female coming in for a follow up office visit regarding type 2 diabetes, hyperlipidemia, hypertension, vitamin D deficiency, primary insomnia/anxiety/caregiver stress, hair loss and GERD; the patient reports me compliant taking medications without untoward side effects the.  The patient is here to review his medical condition, update me on the medical condition and the patient reports me no hospitalizations and no ER visits.  Patient reports me increased stress levels insomnia and worry secondary to patient's recent diagnosis of esophageal CA with metastatic disease he is undergoing chemotherapy.  Patient reports to me insomnia she has been trying over-the-counter melatonin 10 mg but still has awakenings.  No depression no SI diet good , less active , walking with  undergoing going chemo home readings for bp the patient does report to me an ER visit at for a syncopal episode vasovagal in August 20 to 24 after receiving the diagnosis for her  she had  noted her blood pressure on the low end of normal even into the 90s systolically and having dizziness symptoms at that point in time the losartan had been stopped her blood pressure has improved into the 1 10-1 20 range systolically and the dizziness has abated. Using melatonin 10mg hs ; hair loss with brushing  past 6 months gerd more noticible     History obtained from : patient  Review of Systems   Constitutional:  Negative for activity change, appetite change and unexpected weight change.   HENT:  Negative for congestion and postnasal drip.    Eyes:  Negative for visual disturbance.   Respiratory:  Negative for cough and shortness of breath.    Cardiovascular:  Negative for chest pain.   Gastrointestinal:  Negative for abdominal pain, diarrhea, nausea and vomiting.   Neurological:  Negative for dizziness, light-headedness and headaches.   Hematological:  Negative for adenopathy.   GERD, insomnia, anxiety  Social History     Tobacco Use    Smoking status: Never    Smokeless tobacco: Never   Vaping Use    Vaping status: Never Used   Substance and Sexual Activity    Alcohol use: Yes     Alcohol/week: 1.0 standard drink of alcohol     Types: 1 Glasses of wine per week     Comment: Occasional use only    Drug use: Never    Sexual activity: Not Currently     Partners: Male     Birth control/protection: None     SIL-7 Flowsheet Screening      Flowsheet Row Most Recent Value   Over the last two weeks, how often have you been bothered by the following problems?     Feeling nervous, anxious, or on edge 1   Not being able to stop or control worrying 1   Worrying too much about different things 1   Trouble relaxing  0   Being so restless that it's hard to sit still 0   Becoming easily annoyed or irritable  0   Feeling afraid as if something awful might happen 0   How difficult have these problems made it for you to do your work, take care of things at home, or get along with other people?  Not difficult at all   SIL Score   3             Objective     There were no vitals taken for this visit.  PHQ-2/9 Depression Screening    Little interest or pleasure in doing things: 0 - not at all  Feeling down, depressed, or hopeless: 0 - not at all  PHQ-2 Score: 0  PHQ-2 Interpretation: Negative depression screen        Physical Exam  Vitals and nursing note reviewed.   Constitutional:       General: She is not in acute distress.     Appearance: Normal appearance. She is well-developed. She is not ill-appearing, toxic-appearing or diaphoretic.   HENT:      Head: Normocephalic and atraumatic.      Right Ear: External ear normal.      Left Ear: External ear normal.      Nose: Nose normal.      Mouth/Throat:      Mouth: Oropharynx is clear and moist.   Eyes:      Pupils: Pupils are equal, round, and reactive to light.   Cardiovascular:      Rate and Rhythm: Normal rate and regular rhythm.      Heart sounds: Normal heart sounds. No murmur heard.  Pulmonary:      Effort: Pulmonary effort is normal.      Breath sounds: Normal breath sounds.   Abdominal:      General: There is no distension.      Palpations: Abdomen is soft.      Tenderness: There is no abdominal tenderness. There is no guarding.   Musculoskeletal:         General: No edema.   Psychiatric:         Mood and Affect: Mood is anxious. Mood is not depressed.         Thought Content: Thought content does not include suicidal ideation.

## 2024-10-15 NOTE — ASSESSMENT & PLAN NOTE
Anxiety/insomnia related to 's health caregiver stress Rx rozerem 8 mg 1 p.o. nightly as needed  Orders:    ramelteon (ROZEREM) 8 mg tablet; Take 1 tablet (8 mg total) by mouth daily at bedtime

## 2024-10-15 NOTE — ASSESSMENT & PLAN NOTE
Mild symptoms Rx for Pepcid 20 mg once daily as needed see GI for EGD  Orders:    Ambulatory Referral to Gastroenterology; Future    famotidine (PEPCID) 20 mg tablet; Take 1 tablet (20 mg total) by mouth daily as needed for heartburn

## 2024-10-15 NOTE — ASSESSMENT & PLAN NOTE
Check TSH, CBC and iron panel  Orders:    TSH, 3rd generation; Future    CBC (Includes Diff/Plt) (Refl); Future    Iron Panel (Includes Ferritin, Iron Sat%, Iron, and TIBC); Future    Ambulatory Referral to Dermatology; Future

## 2024-10-15 NOTE — ASSESSMENT & PLAN NOTE
Patient's blood pressure is elevated in the office but when she checks at home it is in the normal range 1 10-1 20 in fact it had been lower into the low 100s and 90s for which the losartan was stopped likely secondary to the weight loss for now she will remain off losartan and we will continue to monitor home readings patient does have an element of whitecoat hypertension

## 2024-10-15 NOTE — TELEPHONE ENCOUNTER
Upon review of the In Basket request we were able to locate, review, and update the patient chart as requested for Diabetic Eye Exam.    Any additional questions or concerns should be emailed to the Practice Liaisons via the appropriate education email address, please do not reply via In Basket.    Thank you  Holly Napoles MA   [facility]

## 2024-10-15 NOTE — ASSESSMENT & PLAN NOTE
Hypothyroidism controlled the patient is currently euthyroid I will be ordering a TSH prior to the next office visit and the patient will continue with current medical regiment; we will continue to monitor the patient's progress.  Orders:    TSH, 3rd generation; Future

## 2024-10-15 NOTE — TELEPHONE ENCOUNTER
----- Message from Mckayla FRANCO sent at 10/15/2024 10:19 AM EDT -----  Regarding: Care Gap Request  10/15/24 10:19 AM    Hello, our patient Haris Chirinos has had Diabetic Eye Exam completed/performed. Please assist in updating the patient chart by pulling the document from the Media Tab. The date of service is 5/8/2024.     Thank you,  Mckayla Caal MA  [facility]

## 2024-10-15 NOTE — ASSESSMENT & PLAN NOTE
Continue current dose of vitamin D 3000 IUs once daily and check a vitamin D level  Orders:    Vitamin D 25 hydroxy; Future

## 2024-10-29 ENCOUNTER — APPOINTMENT (OUTPATIENT)
Dept: LAB | Age: 69
End: 2024-10-29
Payer: MEDICARE

## 2024-10-29 DIAGNOSIS — L65.9 HAIR LOSS: ICD-10-CM

## 2024-10-29 DIAGNOSIS — E03.9 HYPOTHYROIDISM, UNSPECIFIED TYPE: ICD-10-CM

## 2024-10-29 LAB
FERRITIN SERPL-MCNC: 33 NG/ML (ref 11–307)
IRON SATN MFR SERPL: 29 % (ref 15–50)
IRON SERPL-MCNC: 115 UG/DL (ref 50–212)
TIBC SERPL-MCNC: 395 UG/DL (ref 250–450)
TSH SERPL DL<=0.05 MIU/L-ACNC: 1.34 UIU/ML (ref 0.45–4.5)
UIBC SERPL-MCNC: 280 UG/DL (ref 155–355)

## 2024-10-29 PROCEDURE — 36415 COLL VENOUS BLD VENIPUNCTURE: CPT

## 2024-10-29 PROCEDURE — 83540 ASSAY OF IRON: CPT

## 2024-10-29 PROCEDURE — 82728 ASSAY OF FERRITIN: CPT

## 2024-10-29 PROCEDURE — 84443 ASSAY THYROID STIM HORMONE: CPT

## 2024-10-29 PROCEDURE — 83550 IRON BINDING TEST: CPT

## 2024-10-30 ENCOUNTER — IOP CHECK (OUTPATIENT)
Dept: URBAN - METROPOLITAN AREA CLINIC 6 | Facility: CLINIC | Age: 69
End: 2024-10-30

## 2024-10-30 DIAGNOSIS — H25.13: ICD-10-CM

## 2024-10-30 DIAGNOSIS — H40.1132: ICD-10-CM

## 2024-10-30 PROCEDURE — 92012 INTRM OPH EXAM EST PATIENT: CPT

## 2024-10-30 PROCEDURE — 92083 EXTENDED VISUAL FIELD XM: CPT

## 2024-10-30 PROCEDURE — 92020 GONIOSCOPY: CPT

## 2024-10-30 ASSESSMENT — VISUAL ACUITY
OS_CC: 20/20-1
OD_CC: 20/20

## 2024-10-30 ASSESSMENT — TONOMETRY
OS_IOP_MMHG: 21
OD_IOP_MMHG: 21

## 2024-10-31 DIAGNOSIS — L65.9 HAIR LOSS: Primary | ICD-10-CM

## 2024-11-07 ENCOUNTER — TELEPHONE (OUTPATIENT)
Age: 69
End: 2024-11-07

## 2024-11-07 NOTE — TELEPHONE ENCOUNTER
Pt calling with multiple referrals to derm for skin lesions and hair loss    Advised pt of next avail, she declined. Closed referrals

## 2024-11-12 ENCOUNTER — TELEPHONE (OUTPATIENT)
Age: 69
End: 2024-11-12

## 2024-11-12 NOTE — TELEPHONE ENCOUNTER
11/12/24  Screened by: Carole Watt    Referring Provider Dr. Glez    Pre- Screening: Yes    There is no height or weight on file to calculate BMI.  Has patient been referred for a routine screening Colonoscopy? yes  Is the patient between 45-75 years old? yes      Previous Colonoscopy yes   If yes:    Date: 2017    Facility:     Reason:     Does the patient want to see a Gastroenterologist prior to their procedure OR are they having any GI symptoms? no    Has the patient been hospitalized or had abdominal surgery in the past 6 months? no    Does the patient use supplemental oxygen? no    Does the patient take Coumadin, Lovenox, Plavix, Elliquis, Xarelto, or other blood thinning medication? no    Has the patient had a stroke, cardiac event, or stent placed in the past year? no

## 2024-11-12 NOTE — TELEPHONE ENCOUNTER
Scheduled date of EGD/colonoscopy (as of today): 11/20/2024  Physician performing EGD/colonoscopy: Dr. Genao  Location of EGD/colonoscopy: [facility]  Desired bowel prep reviewed with patient: PATI/DUL  Prep instructions sent via [employer]  Instructions reviewed with patient by: KUN  Clearances: N/A

## 2024-11-13 DIAGNOSIS — K21.9 GASTROESOPHAGEAL REFLUX DISEASE WITHOUT ESOPHAGITIS: ICD-10-CM

## 2024-11-13 RX ORDER — FAMOTIDINE 20 MG/1
TABLET, FILM COATED ORAL
Qty: 90 TABLET | Refills: 1 | Status: SHIPPED | OUTPATIENT
Start: 2024-11-13

## 2024-11-14 DIAGNOSIS — R19.4 CHANGE IN BOWEL HABIT: Primary | ICD-10-CM

## 2024-11-18 ENCOUNTER — APPOINTMENT (OUTPATIENT)
Dept: LAB | Age: 69
End: 2024-11-18
Payer: MEDICARE

## 2024-11-18 ENCOUNTER — RESULTS FOLLOW-UP (OUTPATIENT)
Dept: INTERNAL MEDICINE CLINIC | Facility: CLINIC | Age: 69
End: 2024-11-18

## 2024-11-18 DIAGNOSIS — R19.4 CHANGE IN BOWEL HABIT: ICD-10-CM

## 2024-11-18 LAB
BASOPHILS # BLD AUTO: 0.07 THOUSANDS/ÂΜL (ref 0–0.1)
BASOPHILS NFR BLD AUTO: 1 % (ref 0–1)
EOSINOPHIL # BLD AUTO: 0.12 THOUSAND/ÂΜL (ref 0–0.61)
EOSINOPHIL NFR BLD AUTO: 2 % (ref 0–6)
ERYTHROCYTE [DISTWIDTH] IN BLOOD BY AUTOMATED COUNT: 12.5 % (ref 11.6–15.1)
HCT VFR BLD AUTO: 36.1 % (ref 34.8–46.1)
HGB BLD-MCNC: 12.2 G/DL (ref 11.5–15.4)
IMM GRANULOCYTES # BLD AUTO: 0.02 THOUSAND/UL (ref 0–0.2)
IMM GRANULOCYTES NFR BLD AUTO: 0 % (ref 0–2)
LYMPHOCYTES # BLD AUTO: 3.07 THOUSANDS/ÂΜL (ref 0.6–4.47)
LYMPHOCYTES NFR BLD AUTO: 43 % (ref 14–44)
MCH RBC QN AUTO: 32.9 PG (ref 26.8–34.3)
MCHC RBC AUTO-ENTMCNC: 33.8 G/DL (ref 31.4–37.4)
MCV RBC AUTO: 97 FL (ref 82–98)
MONOCYTES # BLD AUTO: 0.46 THOUSAND/ÂΜL (ref 0.17–1.22)
MONOCYTES NFR BLD AUTO: 7 % (ref 4–12)
NEUTROPHILS # BLD AUTO: 3.38 THOUSANDS/ÂΜL (ref 1.85–7.62)
NEUTS SEG NFR BLD AUTO: 47 % (ref 43–75)
NRBC BLD AUTO-RTO: 0 /100 WBCS
PLATELET # BLD AUTO: 237 THOUSANDS/UL (ref 149–390)
PMV BLD AUTO: 11.6 FL (ref 8.9–12.7)
RBC # BLD AUTO: 3.71 MILLION/UL (ref 3.81–5.12)
WBC # BLD AUTO: 7.12 THOUSAND/UL (ref 4.31–10.16)

## 2024-11-18 PROCEDURE — 85025 COMPLETE CBC W/AUTO DIFF WBC: CPT

## 2024-11-18 PROCEDURE — 36415 COLL VENOUS BLD VENIPUNCTURE: CPT

## 2024-11-19 ENCOUNTER — TELEPHONE (OUTPATIENT)
Age: 69
End: 2024-11-19

## 2024-11-19 NOTE — TELEPHONE ENCOUNTER
Pt called requesting to schedule an appointment with Dr Tim. Pt asking for first available. First available in January 2025. Pt did not schedule.

## 2024-11-20 ENCOUNTER — ANESTHESIA EVENT (OUTPATIENT)
Dept: GASTROENTEROLOGY | Facility: HOSPITAL | Age: 69
End: 2024-11-20
Payer: MEDICARE

## 2024-11-20 ENCOUNTER — ANESTHESIA (OUTPATIENT)
Dept: GASTROENTEROLOGY | Facility: HOSPITAL | Age: 69
End: 2024-11-20
Payer: MEDICARE

## 2024-11-20 ENCOUNTER — HOSPITAL ENCOUNTER (OUTPATIENT)
Dept: GASTROENTEROLOGY | Facility: HOSPITAL | Age: 69
Setting detail: OUTPATIENT SURGERY
Discharge: HOME | End: 2024-11-20
Attending: COLON & RECTAL SURGERY
Payer: MEDICARE

## 2024-11-20 VITALS
HEIGHT: 63 IN | OXYGEN SATURATION: 100 % | BODY MASS INDEX: 23.21 KG/M2 | WEIGHT: 131 LBS | DIASTOLIC BLOOD PRESSURE: 76 MMHG | RESPIRATION RATE: 16 BRPM | TEMPERATURE: 98.2 F | SYSTOLIC BLOOD PRESSURE: 138 MMHG | HEART RATE: 60 BPM

## 2024-11-20 DIAGNOSIS — Z12.11 SCREENING FOR COLON CANCER: ICD-10-CM

## 2024-11-20 PROCEDURE — 45380 COLONOSCOPY AND BIOPSY: CPT | Performed by: COLON & RECTAL SURGERY

## 2024-11-20 PROCEDURE — 88305 TISSUE EXAM BY PATHOLOGIST: CPT | Performed by: STUDENT IN AN ORGANIZED HEALTH CARE EDUCATION/TRAINING PROGRAM

## 2024-11-20 RX ORDER — PROPOFOL 10 MG/ML
INJECTION, EMULSION INTRAVENOUS AS NEEDED
Status: DISCONTINUED | OUTPATIENT
Start: 2024-11-20 | End: 2024-11-20

## 2024-11-20 RX ORDER — SODIUM CHLORIDE, SODIUM LACTATE, POTASSIUM CHLORIDE, CALCIUM CHLORIDE 600; 310; 30; 20 MG/100ML; MG/100ML; MG/100ML; MG/100ML
INJECTION, SOLUTION INTRAVENOUS CONTINUOUS PRN
Status: DISCONTINUED | OUTPATIENT
Start: 2024-11-20 | End: 2024-11-20

## 2024-11-20 RX ADMIN — PROPOFOL 50 MG: 10 INJECTION, EMULSION INTRAVENOUS at 08:21

## 2024-11-20 RX ADMIN — PROPOFOL 150 MG: 10 INJECTION, EMULSION INTRAVENOUS at 08:10

## 2024-11-20 RX ADMIN — SODIUM CHLORIDE, SODIUM LACTATE, POTASSIUM CHLORIDE, AND CALCIUM CHLORIDE: .6; .31; .03; .02 INJECTION, SOLUTION INTRAVENOUS at 07:59

## 2024-11-20 RX ADMIN — PROPOFOL 50 MG: 10 INJECTION, EMULSION INTRAVENOUS at 08:13

## 2024-11-20 RX ADMIN — PROPOFOL 50 MG: 10 INJECTION, EMULSION INTRAVENOUS at 08:17

## 2024-11-20 NOTE — ANESTHESIA PREPROCEDURE EVALUATION
Procedure:  COLONOSCOPY    Relevant Problems   CARDIO   (+) Hypertension   (+) Mitral valve prolapse   (+) Mixed hyperlipidemia   (+) Primary hypertension      ENDO   (+) Hypothyroidism   (+) Type 2 diabetes mellitus without complication, without long-term current use of insulin (HCC)      GI/HEPATIC   (+) Gastroesophageal reflux disease without esophagitis      MUSCULOSKELETAL   (+) Sciatica of left side      NEURO/PSYCH   (+) Anxiety        Physical Exam    Airway    Mallampati score: II  TM Distance: >3 FB  Neck ROM: full     Dental       Cardiovascular  Cardiovascular exam normal    Pulmonary  Pulmonary exam normal     Other Findings  post-pubertal.      Anesthesia Plan  ASA Score- 2     Anesthesia Type- IV sedation with anesthesia with ASA Monitors.         Additional Monitors:     Airway Plan:            Plan Factors-    Chart reviewed. EKG reviewed.  Existing labs reviewed. Patient summary reviewed.                  Induction- intravenous.    Postoperative Plan-         Informed Consent- Anesthetic plan and risks discussed with patient.  I personally reviewed this patient with the CRNA. Discussed and agreed on the Anesthesia Plan with the CRNA..

## 2024-11-20 NOTE — ANESTHESIA POSTPROCEDURE EVALUATION
Post-Op Assessment Note    CV Status:  Stable  Pain Score: 0    Pain management: adequate       Mental Status:  Arousable and sleepy   Hydration Status:  Stable   PONV Controlled:  Controlled   Airway Patency:  Patent     Post Op Vitals Reviewed: Yes    No anethesia notable event occurred.    Staff: CRNA           Last Filed PACU Vitals:  Vitals Value Taken Time   Temp     Pulse 63    BP 85/53    Resp 22    SpO2 99        Modified Mady:  Activity: 2 (11/20/2024  7:21 AM)  Respiration: 2 (11/20/2024  7:21 AM)  Circulation: 2 (11/20/2024  7:21 AM)  Consciousness: 2 (11/20/2024  7:21 AM)  Oxygen Saturation: 2 (11/20/2024  7:21 AM)  Modified Mady Score: 10 (11/20/2024  7:21 AM)

## 2024-11-20 NOTE — H&P
History and Physical   Colon and Rectal Surgery   Haris Chirinos 68 y.o. female MRN: [id number]  Unit/Bed#:  Encounter: [id number]  11/20/24   7:55 AM      CC:  Screening    History of Present Illness   HPI:  Haris Chirinos is a 68 y.o. female with no GI symptoms.  Recently, she had loose stools. That resolved. She has lighter yellow stools, now, but has no pain or other symptoms.  Historical Information   Past Medical History:   Diagnosis Date    Disease of thyroid gland     Glaucoma     Heart murmur     Hypertension     Hypothyroid     Mitral valve prolapse     Osteopenia     Vitamin D deficiency      History reviewed. No pertinent surgical history.    Meds/Allergies     Not in a hospital admission.      Current Outpatient Medications:     aspirin 81 mg chewable tablet, Chew 81 mg daily at bedtime, Disp: , Rfl:     brimonidine tartrate 0.2 % ophthalmic solution, , Disp: , Rfl:     Calcium-Magnesium-Vitamin D (CITRACAL CALCIUM+D PO), Take 650 mg by mouth in the morning, Disp: , Rfl:     cholecalciferol (VITAMIN D3) 25 mcg (1,000 units) tablet, Take 3,000 mcg by mouth daily, Disp: , Rfl:     Coenzyme Q10 (Co Q10) 100 MG CAPS, Take by mouth, Disp: , Rfl:     famotidine (PEPCID) 20 mg tablet, TAKE 1 TABLET(20 MG) BY MOUTH DAILY AS NEEDED FOR HEARTBURN, Disp: 90 tablet, Rfl: 1    latanoprost (XALATAN) 0.005 % ophthalmic solution, Administer 1 drop to both eyes daily at bedtime, Disp: , Rfl:     levothyroxine (Levoxyl) 88 mcg tablet, TAKE 1 TABLET BY MOUTH EVERY DAY, Disp: 100 tablet, Rfl: 1    rosuvastatin (CRESTOR) 10 MG tablet, TAKE 1 TABLET BY MOUTH EVERY DAY (Patient taking differently: Take 10 mg by mouth daily at bedtime), Disp: 30 tablet, Rfl: 0    No Known Allergies      Social History   Social History     Substance and Sexual Activity   Alcohol Use Yes    Alcohol/week: 1.0 standard drink of alcohol    Types: 1 Glasses of wine per week    Comment: Occasional use only     Social History     Substance  "and Sexual Activity   Drug Use Never     Social History     Tobacco Use   Smoking Status Never   Smokeless Tobacco Never         Family History:   Family History   Problem Relation Age of Onset    Diabetes Mother     Hypertension Mother     Stomach cancer Father 74    Cancer Father         Stomach    No Known Problems Sister     No Known Problems Daughter     Glaucoma Maternal Grandmother     Stroke Maternal Grandfather     Diabetes Maternal Grandfather     No Known Problems Paternal Grandmother     No Known Problems Paternal Grandfather     Prostate cancer Brother 68    No Known Problems Son     No Known Problems Paternal Aunt     Kidney cancer Maternal Uncle 61    Hypertension Brother     Prostate cancer Brother     Glaucoma Brother          Objective     Current Vitals:   Blood Pressure: 140/70 (11/20/24 0738)  Pulse: 73 (11/20/24 0738)  Temperature: 98.1 °F (36.7 °C) (11/20/24 0738)  Temp Source: Temporal (11/20/24 0738)  Respirations: 16 (11/20/24 0738)  Height: 5' 3\" (160 cm) (11/20/24 0738)  Weight - Scale: 59.4 kg (131 lb) (11/20/24 0738)  SpO2: 98 % (11/20/24 0738)  No intake or output data in the 24 hours ending 11/20/24 0755    Physical Exam:  General:  Well nourished, no distress.  Neuro: Alert and oriented  Eyes:Sclera anicteric, conjunctiva pink.  Pulm: Clear to auscultation bilaterally. No respiratory Distress.   CV:  Regular rate and rhythm. No murmurs.  Abdomen:  Soft, flat, non-tender, without masses or hepatosplenomegaly.    Lab Results:       ASSESSMENT:  Haris Chirinos is a 68 y.o. female for screening.  Her symptoms are controlled and she has no diarrhea, now.  PLAN:  Colonoscopy.  Risks , including, but not limited to, bleeding, perforation, missed lesions, and potential need for surgery, were reviewed. Alternatives to colonoscopy were discussed.  BRAYAN Genao MD  "

## 2024-11-21 ENCOUNTER — OFFICE VISIT (OUTPATIENT)
Dept: INTERNAL MEDICINE CLINIC | Facility: CLINIC | Age: 69
End: 2024-11-21
Payer: MEDICARE

## 2024-11-21 VITALS
SYSTOLIC BLOOD PRESSURE: 140 MMHG | WEIGHT: 134 LBS | DIASTOLIC BLOOD PRESSURE: 82 MMHG | HEART RATE: 67 BPM | BODY MASS INDEX: 23.74 KG/M2 | TEMPERATURE: 98.1 F | OXYGEN SATURATION: 99 %

## 2024-11-21 DIAGNOSIS — R19.5 PALE STOOL: Primary | ICD-10-CM

## 2024-11-21 DIAGNOSIS — R63.4 WEIGHT LOSS: ICD-10-CM

## 2024-11-21 PROCEDURE — 99214 OFFICE O/P EST MOD 30 MIN: CPT

## 2024-11-21 PROCEDURE — G2211 COMPLEX E/M VISIT ADD ON: HCPCS

## 2024-11-21 NOTE — ASSESSMENT & PLAN NOTE
Have been experiencing having light yellow stool  Denies any abdominal pain except acid reflux  Mentioned using artificial sweetener in her protein powder for last 1 month  Could not recall if she had hepatitis B vaccine or not  Already had colonoscopy which was normal    Plan:  Ordered CMP to check liver function and bilirubin to rule out any liver disease  Ordered ultrasound of the abdomen to rule out malignancy pancreas, gallbladder pathology  Counseled to continue current diet with avoiding artificial sweetener  Will follow-up with CMP and ultrasound of abdomen results  Orders:    Comprehensive metabolic panel; Future

## 2024-11-21 NOTE — PROGRESS NOTES
Name: Haris Chirinos      : 1955      MRN: [id number]  Encounter Provider: Paulette Caballero MD  Encounter Date: 2024   Encounter department: [facility]  :  Assessment & Plan  Pale stool  Have been experiencing having light yellow stool  Denies any abdominal pain except acid reflux  Mentioned using artificial sweetener in her protein powder for last 1 month  Could not recall if she had hepatitis B vaccine or not  Already had colonoscopy which was normal    Plan:  Ordered CMP to check liver function and bilirubin to rule out any liver disease  Ordered ultrasound of the abdomen to rule out malignancy pancreas, gallbladder pathology  Counseled to continue current diet with avoiding artificial sweetener  Will follow-up with CMP and ultrasound of abdomen results  Orders:    Comprehensive metabolic panel; Future    Weight loss  Patient has history of weight loss since the diagnosis of her 's malignancy  Initially she has started to have weight loss intentionally  Her appetite is good but has family history of stomach cancer  Has complained of foamy stool which floats in water  Differentials include malabsorption, pancreatic malignancy.    Pain:  Ordered fecal fat qualitative measurement  US abdomen complete  CMP    Orders:    US abdomen complete; Future           History of Present Illness     Haris Carreno is a 68-year-old female who has past medical history of hypertension, hypothyroidism, hyperlipidemia, prediabetes presents today with a complaint of pale yellowish stool.  She mentioned that she was here before with the complaint of loose stool and then she changed her diet with low fiber containing diet.  Yesterday she had her colonoscopy with bowel preparation.  This morning her bowel was more formed.  She mentioned that after changing her diet now her stool color is more light yellow color.  Denies any blood, mucus.  She also feels urgency to have bowel  movement with abdominal cramping.  She mentioned that she has been taking artificial sweetener with her protein sugar for last month.  Also her stool is foamy, floating in the water with foul-smelling.  Denies any abdominal pain except having acid reflux.  Did not see any GI yet but is planning to see in January.  Denies any fever, fatigue, weakness, palpitation, nausea, vomiting, loss of appetite.  He has family history of stomach cancer in her father.      Review of Systems   Constitutional:  Negative for chills and fever.   HENT:  Negative for ear pain and sore throat.    Eyes:  Negative for pain and visual disturbance.   Respiratory:  Negative for cough and shortness of breath.    Cardiovascular:  Negative for chest pain and palpitations.   Gastrointestinal:  Positive for diarrhea. Negative for abdominal distention, abdominal pain, blood in stool, constipation, nausea and vomiting.   Genitourinary:  Negative for dysuria and hematuria.   Musculoskeletal:  Negative for arthralgias and back pain.   Skin:  Negative for color change and rash.   Neurological:  Negative for seizures and syncope.   All other systems reviewed and are negative.    Medical History Reviewed by provider this encounter:     .  Current Outpatient Medications on File Prior to Visit   Medication Sig Dispense Refill    aspirin 81 mg chewable tablet Chew 81 mg daily at bedtime      brimonidine tartrate 0.2 % ophthalmic solution       Calcium-Magnesium-Vitamin D (CITRACAL CALCIUM+D PO) Take 650 mg by mouth in the morning      cholecalciferol (VITAMIN D3) 25 mcg (1,000 units) tablet Take 3,000 mcg by mouth daily      Coenzyme Q10 (Co Q10) 100 MG CAPS Take by mouth      famotidine (PEPCID) 20 mg tablet TAKE 1 TABLET(20 MG) BY MOUTH DAILY AS NEEDED FOR HEARTBURN 90 tablet 1    latanoprost (XALATAN) 0.005 % ophthalmic solution Administer 1 drop to both eyes daily at bedtime      levothyroxine (Levoxyl) 88 mcg tablet TAKE 1 TABLET BY MOUTH EVERY   tablet 1    rosuvastatin (CRESTOR) 10 MG tablet TAKE 1 TABLET BY MOUTH EVERY DAY 30 tablet 0     No current facility-administered medications on file prior to visit.      Social History     Tobacco Use    Smoking status: Never    Smokeless tobacco: Never   Vaping Use    Vaping status: Never Used   Substance and Sexual Activity    Alcohol use: Yes     Alcohol/week: 1.0 standard drink of alcohol     Types: 1 Glasses of wine per week     Comment: Occasional use only    Drug use: Never    Sexual activity: Not Currently     Partners: Male     Birth control/protection: None        Objective   /82   Pulse 67   Temp 98.1 °F (36.7 °C)   Wt 60.8 kg (134 lb)   SpO2 99%   BMI 23.74 kg/m²      Physical Exam  Vitals and nursing note reviewed.   Constitutional:       General: She is not in acute distress.     Appearance: She is well-developed.   HENT:      Head: Normocephalic and atraumatic.   Eyes:      Conjunctiva/sclera: Conjunctivae normal.   Cardiovascular:      Rate and Rhythm: Normal rate and regular rhythm.      Heart sounds: No murmur heard.  Pulmonary:      Effort: Pulmonary effort is normal. No respiratory distress.      Breath sounds: Normal breath sounds.   Abdominal:      Palpations: Abdomen is soft.      Tenderness: There is no abdominal tenderness.   Musculoskeletal:         General: No swelling.      Cervical back: Neck supple.   Skin:     General: Skin is warm and dry.      Capillary Refill: Capillary refill takes less than 2 seconds.   Neurological:      Mental Status: She is alert.   Psychiatric:         Mood and Affect: Mood normal.       Administrative Statements   I have spent a total time of 30 minutes in caring for this patient on the day of the visit/encounter including Diagnostic results, Prognosis, Risks and benefits of tx options, Instructions for management, Patient and family education, Importance of tx compliance, Risk factor reductions, Impressions, Counseling / Coordination of care,  Documenting in the medical record, Reviewing / ordering tests, medicine, procedures  , Obtaining or reviewing history  , and Communicating with other healthcare professionals .

## 2024-11-21 NOTE — ASSESSMENT & PLAN NOTE
Patient has history of weight loss since the diagnosis of her 's malignancy  Initially she has started to have weight loss intentionally  Her appetite is good but has family history of stomach cancer  Has complained of foamy stool which floats in water  Differentials include malabsorption, pancreatic malignancy.    Pain:  Ordered fecal fat qualitative measurement  US abdomen complete  CMP    Orders:    US abdomen complete; Future

## 2024-11-21 NOTE — PATIENT INSTRUCTIONS
Please stop taking artificial sweetener  Please have multivitamin with iron supplement from [employer]  Please do CMP, US abdomen, Stool fat

## 2024-11-25 ENCOUNTER — RESULTS FOLLOW-UP (OUTPATIENT)
Dept: OTHER | Facility: HOSPITAL | Age: 69
End: 2024-11-25

## 2024-11-25 ENCOUNTER — APPOINTMENT (OUTPATIENT)
Dept: LAB | Age: 69
End: 2024-11-25
Payer: MEDICARE

## 2024-11-25 DIAGNOSIS — R19.5 PALE STOOL: ICD-10-CM

## 2024-11-25 LAB
ALBUMIN SERPL BCG-MCNC: 4.2 G/DL (ref 3.5–5)
ALP SERPL-CCNC: 63 U/L (ref 34–104)
ALT SERPL W P-5'-P-CCNC: 21 U/L (ref 7–52)
ANION GAP SERPL CALCULATED.3IONS-SCNC: 9 MMOL/L (ref 4–13)
AST SERPL W P-5'-P-CCNC: 18 U/L (ref 13–39)
BILIRUB SERPL-MCNC: 0.57 MG/DL (ref 0.2–1)
BUN SERPL-MCNC: 11 MG/DL (ref 5–25)
CALCIUM SERPL-MCNC: 9.3 MG/DL (ref 8.4–10.2)
CHLORIDE SERPL-SCNC: 99 MMOL/L (ref 96–108)
CO2 SERPL-SCNC: 30 MMOL/L (ref 21–32)
CREAT SERPL-MCNC: 0.87 MG/DL (ref 0.6–1.3)
GFR SERPL CREATININE-BSD FRML MDRD: 68 ML/MIN/1.73SQ M
GLUCOSE P FAST SERPL-MCNC: 131 MG/DL (ref 65–99)
POTASSIUM SERPL-SCNC: 3.9 MMOL/L (ref 3.5–5.3)
PROT SERPL-MCNC: 7.4 G/DL (ref 6.4–8.4)
SODIUM SERPL-SCNC: 138 MMOL/L (ref 135–147)

## 2024-11-25 PROCEDURE — 80053 COMPREHEN METABOLIC PANEL: CPT

## 2024-11-25 PROCEDURE — 36415 COLL VENOUS BLD VENIPUNCTURE: CPT

## 2024-11-25 PROCEDURE — 88305 TISSUE EXAM BY PATHOLOGIST: CPT | Performed by: STUDENT IN AN ORGANIZED HEALTH CARE EDUCATION/TRAINING PROGRAM

## 2024-11-27 ENCOUNTER — OFFICE VISIT (OUTPATIENT)
Dept: GASTROENTEROLOGY | Facility: CLINIC | Age: 69
End: 2024-11-27
Payer: MEDICARE

## 2024-11-27 VITALS
WEIGHT: 135.6 LBS | SYSTOLIC BLOOD PRESSURE: 157 MMHG | HEIGHT: 63 IN | HEART RATE: 62 BPM | BODY MASS INDEX: 24.03 KG/M2 | DIASTOLIC BLOOD PRESSURE: 83 MMHG

## 2024-11-27 DIAGNOSIS — R19.5 PALE STOOL: Primary | ICD-10-CM

## 2024-11-27 DIAGNOSIS — K21.9 GASTROESOPHAGEAL REFLUX DISEASE WITHOUT ESOPHAGITIS: ICD-10-CM

## 2024-11-27 PROCEDURE — 99204 OFFICE O/P NEW MOD 45 MIN: CPT | Performed by: INTERNAL MEDICINE

## 2024-11-27 RX ORDER — SODIUM CHLORIDE, SODIUM LACTATE, POTASSIUM CHLORIDE, CALCIUM CHLORIDE 600; 310; 30; 20 MG/100ML; MG/100ML; MG/100ML; MG/100ML
125 INJECTION, SOLUTION INTRAVENOUS CONTINUOUS
OUTPATIENT
Start: 2024-11-27

## 2024-11-27 RX ORDER — RAMELTEON 8 MG/1
8 TABLET ORAL
COMMUNITY

## 2024-11-27 NOTE — PROGRESS NOTES
Name: Haris Chirinos      : 1955      MRN: [id number]  Encounter Provider: Zeinab Tim MD  Encounter Date: 2024   Encounter department: [facility]  :  Assessment & Plan  Gastroesophageal reflux disease without esophagitis  -Chronic symptoms, worse at nighttime, takes Pepcid 20 mg intermittently at nighttime.  -Would recommend taking Pepcid 20 mg every night as symptoms of predominant nighttime.  -Would recommend EGD, if EGD is suggestive of Rodriguez's esophagus, would recommend PPI instead of H2 blocker.  -Avoid NSAIDs.  -Follow GERD diet.  Orders:    Ambulatory Referral to Gastroenterology    EGD; Future    Pale stool  -Reports that the stool was starting to become pale and yellow prior to her colonoscopy however has now started to form.  Liver enzymes were normal.  -Would recommend the addition of psyllium husk on daily basis to perform of the stool further.  -Can take Pepto-Bismol for diarrhea returns.  -Patient is due for ultrasound of the abdomen, this has been ordered by the PCP.           History of Present Illness     HPI  Haris Chirinos is a 69 y.o. female with history of mitral valve prolapse, presents for evaluation of chronic symptoms of acid reflux which have been worsening over the past few months after her  was diagnosed with esophageal cancer.  Patient reports that she is currently taking famotidine 20 mg at bedtime however occasionally forgets to take it.  Reports that symptoms are mostly at nighttime.  Denies any dysphagia, hematemesis, coffee-ground emesis or melena.  Patient also tells me that she recently had change in bowel habits with loose stools for few weeks followed by which she underwent colonoscopy.  Colonoscopy was unremarkable.  Biopsies were notable for focal active colitis.  Patient reports that the stool has started to form since her colonoscopy but is not fully solid yet.  She reports that the flatulence is  "also improved since the colonoscopy.  She denies any rectal bleeding or melena.  No hematemesis, loss of appetite or early satiety.  Labs are reviewed, hemoglobin normal, liver enzymes normal.        Review of Systems   Constitutional: Negative.    HENT: Negative.     Eyes: Negative.    Respiratory: Negative.     Cardiovascular: Negative.    Gastrointestinal:         See HPI.   Endocrine: Negative.    Genitourinary: Negative.    Musculoskeletal: Negative.    Skin: Negative.    Allergic/Immunologic: Negative.    Neurological: Negative.    Hematological: Negative.    Psychiatric/Behavioral: Negative.     All other systems reviewed and are negative.         Objective   /83 (BP Location: Left arm, Patient Position: Sitting, Cuff Size: Standard)   Pulse 62   Ht 5' 3\" (1.6 m)   Wt 61.5 kg (135 lb 9.6 oz)   BMI 24.02 kg/m²      Physical Exam  Vitals and nursing note reviewed.   Constitutional:       General: She is not in acute distress.     Appearance: She is well-developed.   HENT:      Head: Normocephalic and atraumatic.   Eyes:      General: No scleral icterus.     Conjunctiva/sclera: Conjunctivae normal.   Cardiovascular:      Rate and Rhythm: Normal rate and regular rhythm.      Heart sounds: No murmur heard.  Pulmonary:      Effort: Pulmonary effort is normal. No respiratory distress.      Breath sounds: Normal breath sounds.   Abdominal:      Palpations: Abdomen is soft.      Tenderness: There is no abdominal tenderness.   Musculoskeletal:         General: No swelling.      Cervical back: Neck supple.   Skin:     General: Skin is warm and dry.   Neurological:      Mental Status: She is alert.   Psychiatric:         Mood and Affect: Mood normal.           "

## 2024-11-27 NOTE — PATIENT INSTRUCTIONS
Scheduled date of EGD(as of today): 2/4/25  Physician performing EGD: Dr. Tim  Location of EGD: [facility]  Instructions reviewed with patient by: N.M.  Clearances: CATHLEEN

## 2024-11-27 NOTE — ASSESSMENT & PLAN NOTE
-Reports that the stool was starting to become pale and yellow prior to her colonoscopy however has now started to form.  Liver enzymes were normal.  -Would recommend the addition of psyllium husk on daily basis to perform of the stool further.  -Can take Pepto-Bismol for diarrhea returns.  -Patient is due for ultrasound of the abdomen, this has been ordered by the PCP.

## 2024-11-27 NOTE — ASSESSMENT & PLAN NOTE
-Chronic symptoms, worse at nighttime, takes Pepcid 20 mg intermittently at nighttime.  -Would recommend taking Pepcid 20 mg every night as symptoms of predominant nighttime.  -Would recommend EGD, if EGD is suggestive of Rodriguez's esophagus, would recommend PPI instead of H2 blocker.  -Avoid NSAIDs.  -Follow GERD diet.  Orders:    Ambulatory Referral to Gastroenterology    EGD; Future

## 2024-12-10 ENCOUNTER — HOSPITAL ENCOUNTER (OUTPATIENT)
Dept: RADIOLOGY | Age: 69
Discharge: HOME | End: 2024-12-10
Payer: MEDICARE

## 2024-12-10 DIAGNOSIS — R63.4 WEIGHT LOSS: ICD-10-CM

## 2024-12-10 PROCEDURE — 76700 US EXAM ABDOM COMPLETE: CPT

## 2024-12-22 DIAGNOSIS — E78.2 MIXED HYPERLIPIDEMIA: ICD-10-CM

## 2024-12-24 RX ORDER — ROSUVASTATIN CALCIUM 10 MG/1
10 TABLET, COATED ORAL DAILY
Qty: 90 TABLET | Refills: 1 | Status: SHIPPED | OUTPATIENT
Start: 2024-12-24

## 2025-01-07 ENCOUNTER — HOSPITAL ENCOUNTER (OUTPATIENT)
Dept: RADIOLOGY | Age: 70
Discharge: HOME | End: 2025-01-07
Payer: MEDICARE

## 2025-01-07 VITALS — HEIGHT: 63 IN | WEIGHT: 136 LBS | BODY MASS INDEX: 24.1 KG/M2

## 2025-01-07 DIAGNOSIS — Z12.31 ENCOUNTER FOR SCREENING MAMMOGRAM FOR BREAST CANCER: ICD-10-CM

## 2025-01-07 PROCEDURE — 77063 BREAST TOMOSYNTHESIS BI: CPT

## 2025-01-07 PROCEDURE — 77067 SCR MAMMO BI INCL CAD: CPT

## 2025-01-09 ENCOUNTER — RESULTS FOLLOW-UP (OUTPATIENT)
Dept: INTERNAL MEDICINE CLINIC | Facility: CLINIC | Age: 70
End: 2025-01-09

## 2025-01-13 ENCOUNTER — TELEPHONE (OUTPATIENT)
Dept: GASTROENTEROLOGY | Facility: AMBULARY SURGERY CENTER | Age: 70
End: 2025-01-13

## 2025-01-27 ENCOUNTER — ANESTHESIA EVENT (OUTPATIENT)
Dept: GASTROENTEROLOGY | Facility: HOSPITAL | Age: 70
End: 2025-01-27
Payer: MEDICARE

## 2025-01-27 ENCOUNTER — HOSPITAL ENCOUNTER (OUTPATIENT)
Dept: GASTROENTEROLOGY | Facility: HOSPITAL | Age: 70
Setting detail: OUTPATIENT SURGERY
Discharge: HOME | End: 2025-01-27
Attending: INTERNAL MEDICINE
Payer: MEDICARE

## 2025-01-27 ENCOUNTER — ANESTHESIA (OUTPATIENT)
Dept: GASTROENTEROLOGY | Facility: HOSPITAL | Age: 70
End: 2025-01-27
Payer: MEDICARE

## 2025-01-27 VITALS
HEIGHT: 63 IN | DIASTOLIC BLOOD PRESSURE: 68 MMHG | HEART RATE: 60 BPM | BODY MASS INDEX: 24.1 KG/M2 | TEMPERATURE: 97 F | SYSTOLIC BLOOD PRESSURE: 146 MMHG | OXYGEN SATURATION: 100 % | WEIGHT: 136 LBS | RESPIRATION RATE: 16 BRPM

## 2025-01-27 DIAGNOSIS — K21.9 GASTROESOPHAGEAL REFLUX DISEASE WITHOUT ESOPHAGITIS: ICD-10-CM

## 2025-01-27 PROCEDURE — 88305 TISSUE EXAM BY PATHOLOGIST: CPT | Performed by: STUDENT IN AN ORGANIZED HEALTH CARE EDUCATION/TRAINING PROGRAM

## 2025-01-27 PROCEDURE — 43239 EGD BIOPSY SINGLE/MULTIPLE: CPT | Performed by: INTERNAL MEDICINE

## 2025-01-27 RX ORDER — SODIUM CHLORIDE, SODIUM LACTATE, POTASSIUM CHLORIDE, CALCIUM CHLORIDE 600; 310; 30; 20 MG/100ML; MG/100ML; MG/100ML; MG/100ML
INJECTION, SOLUTION INTRAVENOUS CONTINUOUS PRN
Status: DISCONTINUED | OUTPATIENT
Start: 2025-01-27 | End: 2025-01-27

## 2025-01-27 RX ORDER — SODIUM CHLORIDE, SODIUM LACTATE, POTASSIUM CHLORIDE, CALCIUM CHLORIDE 600; 310; 30; 20 MG/100ML; MG/100ML; MG/100ML; MG/100ML
125 INJECTION, SOLUTION INTRAVENOUS CONTINUOUS
Status: DISCONTINUED | OUTPATIENT
Start: 2025-01-27 | End: 2025-01-31 | Stop reason: HOSPADM

## 2025-01-27 RX ORDER — PROPOFOL 10 MG/ML
INJECTION, EMULSION INTRAVENOUS AS NEEDED
Status: DISCONTINUED | OUTPATIENT
Start: 2025-01-27 | End: 2025-01-27

## 2025-01-27 RX ORDER — LIDOCAINE HYDROCHLORIDE 10 MG/ML
INJECTION, SOLUTION EPIDURAL; INFILTRATION; INTRACAUDAL; PERINEURAL AS NEEDED
Status: DISCONTINUED | OUTPATIENT
Start: 2025-01-27 | End: 2025-01-27

## 2025-01-27 RX ADMIN — LIDOCAINE HYDROCHLORIDE 50 MG: 10 INJECTION, SOLUTION EPIDURAL; INFILTRATION; INTRACAUDAL at 08:54

## 2025-01-27 RX ADMIN — PROPOFOL 40 MG: 10 INJECTION, EMULSION INTRAVENOUS at 08:59

## 2025-01-27 RX ADMIN — PROPOFOL 110 MG: 10 INJECTION, EMULSION INTRAVENOUS at 08:54

## 2025-01-27 RX ADMIN — PROPOFOL 30 MG: 10 INJECTION, EMULSION INTRAVENOUS at 08:56

## 2025-01-27 RX ADMIN — SODIUM CHLORIDE, SODIUM LACTATE, POTASSIUM CHLORIDE, AND CALCIUM CHLORIDE: .6; .31; .03; .02 INJECTION, SOLUTION INTRAVENOUS at 08:51

## 2025-01-27 NOTE — ANESTHESIA PREPROCEDURE EVALUATION
Procedure:  EGD    Relevant Problems   CARDIO   (+) Hypertension   (+) Mitral valve prolapse   (+) Mixed hyperlipidemia   (+) Primary hypertension      ENDO   (+) Hypothyroidism   (+) Type 2 diabetes mellitus without complication, without long-term current use of insulin (HCC)      GI/HEPATIC   (+) Gastroesophageal reflux disease without esophagitis      MUSCULOSKELETAL   (+) Sciatica of left side      NEURO/PSYCH   (+) Anxiety        Physical Exam    Airway    Mallampati score: II  TM Distance: >3 FB  Neck ROM: full     Dental       Cardiovascular  Cardiovascular exam normal    Pulmonary  Pulmonary exam normal     Other Findings  post-pubertal.      Anesthesia Plan  ASA Score- 2     Anesthesia Type- IV sedation with anesthesia with ASA Monitors.         Additional Monitors:     Airway Plan:            Plan Factors-    Chart reviewed. EKG reviewed.  Existing labs reviewed. Patient summary reviewed.                  Induction- intravenous.    Postoperative Plan-         Informed Consent- Anesthetic plan and risks discussed with patient.  I personally reviewed this patient with the CRNA. Discussed and agreed on the Anesthesia Plan with the CRNA..

## 2025-01-27 NOTE — H&P
"History and Physical -  [facility]  Haris Chirinos 69 y.o. female MRN: [id number]    HPI: Haris Chirinos is a 69 y.o. year old female who presents for evaluation of GERD.      Review of Systems    Historical Information   Past Medical History:   Diagnosis Date    Disease of thyroid gland     GERD (gastroesophageal reflux disease)     Glaucoma     Heart murmur     Hyperlipidemia     Hypertension     Hypothyroid     Mitral valve prolapse     Osteopenia     Vitamin D deficiency      Past Surgical History:   Procedure Laterality Date    COLONOSCOPY  11/20/2024    Normal, colitis     Social History   Social History     Substance and Sexual Activity   Alcohol Use Yes    Alcohol/week: 1.0 standard drink of alcohol    Types: 1 Glasses of wine per week    Comment: Occasional use only     Social History     Substance and Sexual Activity   Drug Use Never     Social History     Tobacco Use   Smoking Status Never   Smokeless Tobacco Never     Family History   Problem Relation Age of Onset    Diabetes Mother     Hypertension Mother     Stomach cancer Father 74    Cancer Father         Stomach    No Known Problems Sister     No Known Problems Daughter     Glaucoma Maternal Grandmother     Stroke Maternal Grandfather     Diabetes Maternal Grandfather     No Known Problems Paternal Grandmother     No Known Problems Paternal Grandfather     Prostate cancer Brother 68    No Known Problems Son     No Known Problems Paternal Aunt     Kidney cancer Maternal Uncle 61    Hypertension Brother     Prostate cancer Brother     Glaucoma Brother     Cancer Brother        Meds/Allergies     Not in a hospital admission.    No Known Allergies    Objective     BP (!) 172/79   Pulse 61   Temp (!) 96.9 °F (36.1 °C) (Temporal)   Resp 16   Ht 5' 3\" (1.6 m)   Wt 61.7 kg (136 lb)   SpO2 100%   BMI 24.09 kg/m²       PHYSICAL EXAM    Gen: NAD  CV: RRR  CHEST: Clear  ABD: soft, NT/ND  EXT: no edema  Neuro: " AAO      ASSESSMENT/PLAN:  This is a 69 y.o. year old female here for evaluation of GERD.    PLAN:   Procedure: EGD.

## 2025-01-27 NOTE — ANESTHESIA POSTPROCEDURE EVALUATION
Post-Op Assessment Note    CV Status:  Stable    Pain management: adequate       Mental Status:  Alert and awake   Hydration Status:  Euvolemic   PONV Controlled:  Controlled   Airway Patency:  Patent     Post Op Vitals Reviewed: Yes    No anethesia notable event occurred.    Staff: CRNA           Last Filed PACU Vitals:  Vitals Value Taken Time   Temp 97 °F (36.1 °C) 01/27/25 0906   Pulse 60 01/27/25 0906   /58 01/27/25 0906   Resp 16 01/27/25 0906   SpO2 98 % 01/27/25 0906       Modified Mady:     Vitals Value Taken Time   Activity 2 01/27/25 0906   Respiration 2 01/27/25 0906   Circulation 2 01/27/25 0906   Consciousness 1 01/27/25 0906   Oxygen Saturation 2 01/27/25 0906     Modified Mady Score: 9

## 2025-01-29 PROCEDURE — 88305 TISSUE EXAM BY PATHOLOGIST: CPT | Performed by: STUDENT IN AN ORGANIZED HEALTH CARE EDUCATION/TRAINING PROGRAM

## 2025-02-01 DIAGNOSIS — E03.8 OTHER SPECIFIED HYPOTHYROIDISM: ICD-10-CM

## 2025-02-01 RX ORDER — LEVOTHYROXINE SODIUM 88 UG/1
88 TABLET ORAL DAILY
Qty: 90 TABLET | Refills: 0 | Status: SHIPPED | OUTPATIENT
Start: 2025-02-01

## 2025-02-03 ENCOUNTER — OFFICE VISIT (OUTPATIENT)
Dept: OBGYN CLINIC | Facility: CLINIC | Age: 70
End: 2025-02-03
Payer: MEDICARE

## 2025-02-03 VITALS
BODY MASS INDEX: 24.45 KG/M2 | SYSTOLIC BLOOD PRESSURE: 120 MMHG | DIASTOLIC BLOOD PRESSURE: 82 MMHG | HEIGHT: 63 IN | WEIGHT: 138 LBS

## 2025-02-03 DIAGNOSIS — B37.49 GENITAL CANDIDIASIS: ICD-10-CM

## 2025-02-03 DIAGNOSIS — B96.89 BV (BACTERIAL VAGINOSIS): ICD-10-CM

## 2025-02-03 DIAGNOSIS — N76.0 BV (BACTERIAL VAGINOSIS): ICD-10-CM

## 2025-02-03 DIAGNOSIS — N89.8 VAGINAL DISCHARGE: Primary | ICD-10-CM

## 2025-02-03 DIAGNOSIS — N76.0 ACUTE VAGINITIS: ICD-10-CM

## 2025-02-03 PROCEDURE — 99213 OFFICE O/P EST LOW 20 MIN: CPT | Performed by: PHYSICIAN ASSISTANT

## 2025-02-03 RX ORDER — MULTIVIT WITH MINERALS/LUTEIN
1 TABLET ORAL DAILY
COMMUNITY

## 2025-02-03 NOTE — PROGRESS NOTES
Assessment/Plan:      Diagnoses and all orders for this visit:    Vaginal discharge  -     Sureswab(R), Bacterial Vaginosis/Vaginitis    Acute vaginitis  -     Sureswab(R), Bacterial Vaginosis/Vaginitis    Genital candidiasis  -     Sureswab(R), Bacterial Vaginosis/Vaginitis    BV (bacterial vaginosis)  -     Sureswab(R), Bacterial Vaginosis/Vaginitis    Other orders  -     Multiple Vitamins-Minerals (Centrum Silver) tablet; Take 1 tablet by mouth daily          Subjective:     Patient ID: Haris Chirinos is a 69 y.o. female.    Pt presents for a problem today  She complains of discharge off and on x several weeks  Denies itching, burning, odor  Bowel and bladder ok  No bleeding  Denies any recent change in meds, products, partner...    Cultures done  Daily probiotic  Witch hazel prn        Review of Systems   Constitutional:  Negative for chills, fever and unexpected weight change.   HENT:  Negative for ear pain and sore throat.    Eyes:  Negative for pain and visual disturbance.   Respiratory:  Negative for cough and shortness of breath.    Cardiovascular:  Negative for chest pain and palpitations.   Gastrointestinal:  Negative for abdominal pain, blood in stool, constipation, diarrhea and vomiting.   Genitourinary:  Positive for vaginal discharge. Negative for dysuria, hematuria, vaginal bleeding and vaginal pain.   Musculoskeletal:  Negative for arthralgias and back pain.   Skin:  Negative for color change and rash.   Neurological:  Negative for seizures and syncope.   All other systems reviewed and are negative.        Objective:     Physical Exam  Vitals and nursing note reviewed.   Constitutional:       Appearance: Normal appearance. She is well-developed.   Genitourinary:     Exam position: Supine.      Labia:         Right: No rash or lesion.         Left: No rash or lesion.       Vagina: Vaginal discharge and erythema present.      Cervix: No cervical motion tenderness, discharge or friability.    Lymphadenopathy:      Lower Body: No right inguinal adenopathy. No left inguinal adenopathy.   Neurological:      Mental Status: She is alert.

## 2025-02-04 ENCOUNTER — RA CDI HCC (OUTPATIENT)
Dept: OTHER | Facility: HOSPITAL | Age: 70
End: 2025-02-04

## 2025-02-04 ENCOUNTER — RESULTS FOLLOW-UP (OUTPATIENT)
Dept: GASTROENTEROLOGY | Facility: AMBULARY SURGERY CENTER | Age: 70
End: 2025-02-04

## 2025-02-04 LAB
BV BACTERIA RRNA VAG QL NAA+PROBE: NEGATIVE
C GLABRATA RNA VAG QL NAA+PROBE: NOT DETECTED
CANDIDA RRNA VAG QL PROBE: NOT DETECTED
T VAGINALIS RRNA SPEC QL NAA+PROBE: NOT DETECTED

## 2025-02-05 ENCOUNTER — RESULTS FOLLOW-UP (OUTPATIENT)
Dept: OBGYN CLINIC | Facility: CLINIC | Age: 70
End: 2025-02-05

## 2025-02-19 ENCOUNTER — ESTABLISHED COMPREHENSIVE EXAM (OUTPATIENT)
Dept: URBAN - METROPOLITAN AREA CLINIC 6 | Facility: CLINIC | Age: 70
End: 2025-02-19

## 2025-02-19 DIAGNOSIS — H40.1132: ICD-10-CM

## 2025-02-19 DIAGNOSIS — H02.823: ICD-10-CM

## 2025-02-19 DIAGNOSIS — R73.09: ICD-10-CM

## 2025-02-19 DIAGNOSIS — H43.813: ICD-10-CM

## 2025-02-19 DIAGNOSIS — H25.813: ICD-10-CM

## 2025-02-19 DIAGNOSIS — H02.826: ICD-10-CM

## 2025-02-19 DIAGNOSIS — D23.112: ICD-10-CM

## 2025-02-19 LAB
LEFT EYE DIABETIC RETINOPATHY: NORMAL
RIGHT EYE DIABETIC RETINOPATHY: NORMAL

## 2025-02-19 PROCEDURE — 92014 COMPRE OPH EXAM EST PT 1/>: CPT

## 2025-02-19 PROCEDURE — 92133 CPTRZD OPH DX IMG PST SGM ON: CPT

## 2025-02-19 ASSESSMENT — TONOMETRY
OD_IOP_MMHG: 14
OS_IOP_MMHG: 15
OD_IOP_MMHG: 14
OS_IOP_MMHG: 15

## 2025-02-19 ASSESSMENT — VISUAL ACUITY
OS_CC: 20/20-2
OS_GLARE: 20/30
OD_CC: 20/20-2
OD_GLARE: 20/25

## 2025-05-18 NOTE — PATIENT INSTRUCTIONS
-Your shoulder pain is likely due in part to rotator cuff tendinitis and biceps tendinitis  -A referral has been made to physical therapy  -For pain you may use ibuprofen as needed  -You may also apply ice or heat to the affected area if there is no improvement consider referral to an orthopedic specialist [[id number]]

## (undated) RX ORDER — TIMOLOL MALEATE OPHTHALMIC GEL FORMING SOLUTION, 0.25% 2.5 MG/ML: 1 SOLUTION/ DROPS OPHTHALMIC EVERY MORNING

## (undated) RX ORDER — BRIMONIDINE TARTRATE 2 MG/MG: 1 SOLUTION/ DROPS OPHTHALMIC TWICE A DAY